# Patient Record
Sex: FEMALE | Race: WHITE
[De-identification: names, ages, dates, MRNs, and addresses within clinical notes are randomized per-mention and may not be internally consistent; named-entity substitution may affect disease eponyms.]

---
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
## 2020-02-17 ENCOUNTER — HOSPITAL ENCOUNTER (OUTPATIENT)
Dept: HOSPITAL 53 - M LAB | Age: 20
End: 2020-02-17
Attending: INTERNAL MEDICINE
Payer: COMMERCIAL

## 2020-02-17 DIAGNOSIS — E73.9: Primary | ICD-10-CM

## 2020-02-17 LAB
IGA SERPL-MCNC: 86 MG/DL (ref 70–400)
TSH SERPL DL<=0.005 MIU/L-ACNC: 0.44 UIU/ML (ref 0.46–3.98)

## 2020-02-28 ENCOUNTER — HOSPITAL ENCOUNTER (OUTPATIENT)
Dept: HOSPITAL 53 - M OPP | Age: 20
Discharge: HOME | End: 2020-02-28
Attending: INTERNAL MEDICINE
Payer: COMMERCIAL

## 2020-02-28 VITALS — SYSTOLIC BLOOD PRESSURE: 110 MMHG | DIASTOLIC BLOOD PRESSURE: 64 MMHG

## 2020-02-28 VITALS — BODY MASS INDEX: 24.8 KG/M2 | HEIGHT: 63 IN | WEIGHT: 140 LBS

## 2020-02-28 DIAGNOSIS — K64.8: Primary | ICD-10-CM

## 2020-02-28 DIAGNOSIS — K58.0: ICD-10-CM

## 2020-02-28 DIAGNOSIS — R19.7: ICD-10-CM

## 2020-02-28 NOTE — ROOR
________________________________________________________________________________

Patient Name: Brook Chugn        Procedure Date: 2/28/2020 9:34 AM

MRN: [id number]                          Account Number: [id number]

YOB: 2000               Age: 19

Room: [facility]                            Gender: Female

Note Status: Finalized                 

________________________________________________________________________________

 

Procedure:           Colonoscopy

Indications:         Clinically significant diarrhea of unexplained origin, 

                     Irritable bowel syndrome with diarrhea

Providers:           Jayson HUFF MD

Referring MD:        Rosa Elena HENDERSON NP

Requesting Provider: 

Medicines:           Monitored Anesthesia Care

Complications:       No immediate complications.

________________________________________________________________________________

Procedure:           Pre-Anesthesia Assessment:

                     - The heart rate, respiratory rate, oxygen saturations, 

                     blood pressure, adequacy of pulmonary ventilation, and 

                     response to care were monitored throughout the procedure.

                     The Colonoscope was introduced through the anus and 

                     advanced to 10 cm into the ileum. The colonoscopy was 

                     performed without difficulty. The patient tolerated the 

                     procedure well. The quality of the bowel preparation was 

                     good.

                                                                                

Findings:

     The perianal and digital rectal examinations were normal.

     Small Internal Hemorrhoids.

     The entire examined colon appeared normal on direct and retroflexion 

     views.

     The terminal ileum appeared normal.

                                                                                

Impression:          - Small Internal Hemorrhoids.

                     - The entire examined colon is normal on direct and 

                     retroflexion views.

                     - The examined portion of the ileum was normal.

                     - No specimens collected.

                     - (Irritable Bowel Syndrome/IBS-D suspected.)

                     - (lactose intolerance suspected)

Recommendation:      - Use fiber, for example Citrucel, Fibercon, Konsyl or 

                     Metamucil.

                     - Lactose free diet.

                                                                                

 

Jayson Huff MD

________________

Jayson HUFF MD

2/28/2020 9:55:14 AM

Electronically signed by Jayson HUFF MD

Number of Addenda: 0

 

Note Initiated On: 2/28/2020 9:34 AM

Estimated Blood Loss:

     Estimated blood loss: none.

## 2020-05-29 ENCOUNTER — HOSPITAL ENCOUNTER (EMERGENCY)
Dept: HOSPITAL 53 - M ED | Age: 20
Discharge: HOME | End: 2020-05-29
Payer: COMMERCIAL

## 2020-05-29 VITALS — HEIGHT: 63 IN | BODY MASS INDEX: 24.41 KG/M2 | WEIGHT: 137.79 LBS

## 2020-05-29 VITALS — SYSTOLIC BLOOD PRESSURE: 106 MMHG | DIASTOLIC BLOOD PRESSURE: 54 MMHG

## 2020-05-29 DIAGNOSIS — S00.03XA: Primary | ICD-10-CM
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
## 2021-06-03 ENCOUNTER — HOSPITAL ENCOUNTER (OUTPATIENT)
Dept: HOSPITAL 53 - M PLALAB | Age: 21
End: 2021-06-03
Attending: NURSE PRACTITIONER
Payer: COMMERCIAL

## 2021-06-03 DIAGNOSIS — N89.8: Primary | ICD-10-CM

## 2021-06-03 DIAGNOSIS — Z11.3: ICD-10-CM

## 2021-10-23 ENCOUNTER — HOSPITAL ENCOUNTER (EMERGENCY)
Dept: HOSPITAL 53 - M ED | Age: 21
Discharge: LEFT BEFORE DISCHARGE | End: 2021-10-23
Payer: COMMERCIAL

## 2021-10-23 VITALS — WEIGHT: 147.93 LBS | BODY MASS INDEX: 26.21 KG/M2 | HEIGHT: 63 IN

## 2021-10-23 VITALS — DIASTOLIC BLOOD PRESSURE: 58 MMHG | SYSTOLIC BLOOD PRESSURE: 119 MMHG

## 2021-10-23 DIAGNOSIS — Z53.29: Primary | ICD-10-CM

## 2021-10-23 NOTE — CCD
Continuity of Care Document (C-CDA R2.1) (Encounter date: 10/12/2021 03:15 PM)

                             Created on: 10/14/2021



Brook Anaya

External Reference #: [id number]

: 2000

Sex: Female



Demographics





                          Address                   [address] [place] [address]

 

                          Home Phone                [phone]

 

                          Preferred Language        Unknown

 

                          Marital Status            Never 

 

                          Nondenominational Affiliation     Unknown

 

                          Race                      Unknown

 

                          Additional Race(s)        Other Race



 

                          Ethnic Group              Not  or 





Author





                          Author                    [facility]

 

                          Organization              [facility]

 

                          Address                   Unknown

 

                          Phone                     Unavailable







Support





                Name            Relationship    Address         Phone

 

                No Information  Caregiver       Unknown         Unavailable







Care Team Providers





                    Care Team Member Name Role                Phone

 

                    Dwello Irma BLANK Unavailable         Unavailable







Allergies, Adverse Reactions, Alerts





                Substance       Reaction        Status          Criticality

 

                No Known Allergies                                              

   Active          No Information







Medications





           Medication Instructions Dosage     Effective Dates (start - stop) Sta

tus     Comments

 

                          Metrogel Vaginal 0.75 %   insert 1 applicatorful by va

ginal route  every day at 

bedtime x 5 nights                 Oct- - Oct- Active           

 

           glycopyrrolate 1 mg tablet                        -         Active   

   

 

           Nexplanon 68 mg subdermal implant                        -         Ac

tive      







Problems





                Condition       Effective Dates (start - stop) Clinical Status C

omments

 

                Human immunodeficiency virus [HIV] counseling                   

               

 

                Other sex counseling                                  

 

                Encounter for oth general cnsl and advice on contraception      

                            

 

                Encntr screen for infections w sexl mode of transmiss           

                       

 

                Other specified noninflammatory disorders of vagina             

                     

 

                Acute vaginitis                                  

 

                Encounter for screening for human immunodeficiency virus Oct-12-

2021 -                    

 

                Other sex counseling                                  

 

                Encounter for oth general cnsl and advice on contraception      

                            

 

                Human immunodeficiency virus [HIV] counseling                   

               

 

                Encntr screen for infections w sexl mode of transmiss           

                       

 

                High risk heterosexual behavior                                 

 

 

                Acute vaginitis                                  

 

                Encounter for pregnancy test, result negative                   

               

 

                Other sex counseling                                  

 

                Encounter for oth general cnsl and advice on contraception      

                            

 

                Acute vaginitis                                  

 

                Encntr screen for infections w sexl mode of transmiss           

                       

 

                High risk heterosexual behavior                                 

 

 

                High risk heterosexual behavior                                 

 

 

                Encntr screen for infections w sexl mode of transmiss           

                       

 

                Encounter for pregnancy test, result negative                   

               

 

                Other sex counseling                                  

 

                Encounter for oth general cnsl and advice on contraception      

                            

 

                Encounter for pregnancy test, result negative                   

               

 

                Dysuria                                          

 

                Encounter for oth general cnsl and advice on contraception      

                            

 

                Encounter for surveillance of contraceptive pills               

                   

 

                Encntr screen for infections w sexl mode of transmiss           

                       

 

                Other sex counseling                                  

 

                High risk heterosexual behavior                                 

 

 

                Candidiasis of vulva and vagina                                 

 

 

                Human immunodeficiency virus [HIV] counseling                   

               

 

                Encntr for gyn exam (general) (routine) w/o abn findings        

                          

 

                Encounter for oth general cnsl and advice on contraception      

                            

 

                Encounter for surveillance of contraceptive pills               

                   

 

                Human immunodeficiency virus [HIV] counseling                   

               

 

                Encounter for pregnancy test, result negative                   

               

 

                Encntr screen for infections w sexl mode of transmiss           

                       

 

                High risk heterosexual behavior                                 

 

 

                Encounter for oth general cnsl and advice on contraception      

                            

 

                Encounter for initial prescription of contraceptive pills       

                           

 

                Encounter for screening for human immunodeficiency virus        

                          







Procedures





                          Procedure                 Date

 

                          ROUTINE VENIPUNCTURE      Oct-

 

                          OFFICE VISIT, EST         Oct-

 

                          CHYLMD TRACH, SWAB        Oct-

 

                          N.GONORRHOEAE, SWAB       Oct-

 

                          HTLV/HIV SERUM TEST       Oct-

 

                          SYPHILLIS BLOOD SEROLOGY, QUALITATIVE Oct-

 

                          ASSAY OF BODY FLUID ACIDITY Oct-

 

                          SMEAR, WET MOUNT, SALINE/INK Oct-

 

                          CVR Blood Pressure        Oct-

 

                          CVR Med.Svc. Height/Weight Oct-

 

                          CVR Med.Svc. Vaginitis Rx Oct-

 

                          CVR Cns.Svc. Contraceptive Oct-

 

                          CVR Cns.Svc. Other        Oct-

 

                          CVR Cns.Svc. STI / H      Oct-







Results





        Test Name Date and Time Measure Units   Reference Range Abnormal Flag St

atus  

Comments

 

                    Panel Description: Wet Prep  Final                

 

                    Wet Prep            Oct- 15:46:57 Hyphae/Candida: no;

 Budding yeast: no; Trich: no; 

Clue cells: yes (>=20%); WBCs: no; Amine/Whiff test: positive; pH: 5.5          

                       A               

Final                                    

 

                    Panel Description: Vaginal pH  Final                

 

        Vaginal pH Oct- 15:46:40 pH: 5.5.                         Final  

  







Advance Directives





                Directive       Yes / No        Effective Date  File Name

 

                                        No Information 







Encounters





          Encounter Description Practice  Location  Reason(s) For Visit Diagnose

s Date      

Provider                                Providers Copied on Encounter

 

                          OFFICE VISIT, EST         [facility], [address], [place], [id number],  tel:[phone] [facility]          Vaginal Discharge 

(chief 

complaint)Vaginal Odor (chief complaint) Human immunodeficiency virus [HIV] 

counselingOther sex counselingEncounter for oth general cnsl and advice on 
contraceptionEncntr screen for infections w sexl mode of transmissOther 
specified noninflammatory disorders of vaginaAcute vaginitisEncounter for 
screening for human immunodeficiency virus Oct-               Fabian Zeng. [address] [place] [address], [id number], [place].  tel:[phone] Referring 

Provider: Irma Peralta, [address] [place] [address], [id number]. 
tel:[phone]

 

                                                    [facility], [address] [place] [address], [id number], 

[place] tel:[phone] [facility]                        Other sex counselin

gEncounter for oth 

general cnsl and advice on contraceptionHuman immunodeficiency virus [HIV] 
counselingEncntr screen for infections w sexl mode of transmissHigh risk 
heterosexual behaviorAcute vaginitis                Ana Dominguez. [address]

[address] [place], [place], [id number], [place].  tel:[phone] Referring Provider: 

Angelica Perez, [address] [place] [address], [id number]. tel:[phone]

 

                                                    [facility], [address] [place] [address], [id number], 

[place] tel:[phone] [facility]                        Encounter for pregn

arsen test, result 

negativeOther sex counselingEncounter for oth general cnsl and advice on 
contraceptionAcute vaginitisEncntr screen for infections w sexl mode of 
transmissHigh risk heterosexual behavior Mar-               Jose Daniel rivas. [address] [place] [address], [id number], [place].  tel:[phone] Referring Provider: 

Xiao Sky, [address] [place] [address], [id number]. tel:[phone]

 

                                                    [facility], [address] [place] [address], [id number], 

[place] tel:[phone] [facility]                        High risk heterosex

ual behaviorEncntr 

screen for infections w sexl mode of transmissEncounter for pregnancy test, 
result negativeOther sex counselingEncounter for oth general cnsl and advice on 
contraception                            Blaze Lewis. [address] [facility], [place], [place], [id number], 

[place].  tel:[phone]                  Referring Provider: Debbie Thakur, [address] [place], [place], [id number]. tel:[phone]

 

                                                    [facility], [address], [place], [id number], 

[place] tel:[phone] [facility]                        Encounter for pregn

arsen test, result 

negativeDysuriaEncounter for oth general cnsl and advice on 
contraceptionEncounter for surveillance of contraceptive pillsEncntr screen for 
infections w sexl mode of transmissOther sex counselingHigh risk heterosexual 
behaviorCandidiasis of vulva and vagina Oct-               Blaze Lewis. [address]

[address], [place], [id number], [place].  tel:[phone] Referring Provider: Debbie Thakur, [address], [place], [id number]. tel:[phone]

 

                                                    [facility], [address] [place] [address], [id number], 

[place] tel:[phone] [facility]                        Human immunodeficie

ncy virus [HIV] 

counselingEncntr for gyn exam (general) (routine) w/o abn findingsEncounter for 
oth general cnsl and advice on contraceptionEncounter for surveillance of 
contraceptive pills                      King Mayte. [address] [place], [place], 

[id number].  tel:[phone]           Referring Provider: Mayte Pratt, [address], [place], [id number]. tel:[phone]

 

                                                    [facility], [address], [place], [id number], 

[place] tel:[phone] [facility]                        Human immunodeficie

ncy virus [HIV] 

counselingEncounter for pregnancy test, result negativeEncntr screen for 
infections w sexl mode of transmissHigh risk heterosexual behaviorEncounter for 
oth general cnsl and advice on contraceptionEncounter for initial prescription 
of contraceptive pillsEncounter for screening for human immunodeficiency virus 

May-                             King Mayte. [address] [place] [address], [id number].  

tel:[phone]                       Referring Provider: Mayte Pratt, [address], [place], [id number]. tel:[phone]







Family History





                    Family Member       Diagnosis           Age At Onset

 

                    Brother             No history of Stroke  

 

                    Sister              No history of Myocardial infarction  

 

                    Brother             No history of Myocardial infarction  

 

                    1st degree relative No hx of cancer of breast, colon, endome

trium or ovary  

 

                    Maternal grandmother Cancer               

 

                    Maternal grandmother Cancer, throat       

 

                    Mother              No history of Stroke  

 

                    Paternal grandmother Diabetes mellitus    

 

                    1st degree relative No hx of coronary heart disease (female 

<65, male <55)  

 

                    Mother              No history of Myocardial infarction  

 

                    1st degree relative No hx of osteoporosis  

 

                    Maternal grandmother Cancer, lung         

 

                    Father              No history of Myocardial infarction  

 

                    Sister              No history of Stroke  

 

                    1st degree relative No hx of venous thromboembolism  

 

                    Father              No history of Stroke  







Immunizations





                Vaccine         Date            Status          Comments

 

                                        No Information 







Payers





                Payer name      Insurance type  Covered party ID Authorization(s

)

 

                [facility]         [place]              [id number]        







Social History





             Type         Description  Quantity     Date Captured Comments

 

             Alcohol Use Details Unknown                   Oct-   

 

             Caffeine Use Details Unknown                   Oct-   

 

             Tobacco Use Status Current non-smoker              Oct-   

 

             Smoking Status Never smoker              Oct-   

 

                          Non-Smoking Tobacco Use Details 



: No Details Available

                                        



: No Details Available

                          Oct-                

 

             Birth Sex    Female                                  







Vital Signs





        Date / Time: Height  Weight  BMI     Pulse Rate Blood Pressure Temperatu

re Respiratory

 Rate        Body Surface Area Head Circumference BMI percentile Pulse Ox     In

haled Ox

 

       Oct- 3:17 PM 63.00 in 143.80 lbs 25.47 kg/meter(2)        120/78 m

m[Hg]                      

                                                             







Chief Complaint And Reason For Visit





*** Most recent encounter only, dated '10/12/2021 15:15'. ***



Vaginal Discharge (chief complaint)



Vaginal Odor (chief complaint)



Reason For Referral





                                        Reason For Referral

 

                                        No Information







Plan Of Treatment





                Date            Type            Action          Status

 

                                        No Information 







History Of Present Illness





                    Encounter Date      Complaint           History Of Present I

llness

 

                                        No Information 







Functional Status





                          Date                      Functional Assessment

 

                                        No Information 







Medications Administered





           Medication Instructions Dosage     Effective Dates (start - stop) Sta

tus     Comments

 

                                        No Information 







Instructions





                    Date                Instruction         Additional Informati

on

 

                                        No Information 







Assessments





                    Type                Assessment          Date

 

                    assessment          Human immunodeficiency virus [HIV] couns

eling Oct-

 

                    assessment          Other sex counseling Oct-

 

                    assessment          Encounter for oth general cnsl and advic

e on contraception 

Oct-

 

                    assessment          Encntr screen for infections w sexl mode

 of transmiss Oct-

 

                    assessment          Other specified noninflammatory disorder

s of vagina Oct-

 

                    assessment          Acute vaginitis     Oct-







Goals





           Health Concern Goal       Type       Priority   Status     Date

 

                                        No Information 







Medical Equipment





             Description  Device       Universal Device Identifier Effective Jairo

es (start - stop) 

Status

 

                                        No Information 







Mental Status





                          Date                      Cognitive Assessment

 

                                        No Information 







Health Concerns





                          Observation               Date

 

                                        No Information 







                    Concern             Status              Date

 

                                        No Information 







Physical Examination





                    Exam                Findings            Details

 

                    Neurological        Normal              Level of consciousne

ss - Normal. Orientation - Normal.

 

                    Genitourinary       Normal              Urethral meatus - No

rmal. Urethra - Normal. External 

genitalia - Normal. Cervix - Normal.

 

                    Genitourinary       *                   Vagina - white cream

y discharge w/ odor.

## 2021-10-23 NOTE — CCD
Summarization Of Episode

                             Created on: 10/23/2021



EVETTE CASTANEDA

External Reference #: [id number]

: 2000

Sex: Undifferentiated



Demographics





                          Address                   [address] [place] [address]

 

                          Home Phone                [phone]

 

                          Preferred Language        English

 

                          Marital Status            Unknown

 

                          Anabaptist Affiliation     Unknown

 

                          Race                      Unknown

 

                          Ethnic Group              Not  or 





Author





                          Author                    [facility]

 

                          Organization              [facility]

 

                          Address                   Unknown

 

                          Phone                     Unavailable







Support





                Name            Relationship    Address         Phone

 

                    [facility]     [place] [address]                     Unavailable

 

                    Azucena Bull Next Of Kin         [address] [place] [address]                    [phone]

 

                    Arianna Aldana Next Of Kin         [address] [place] [address]                    [phone]

 

                    Shaista HOANG,  Cynthia Next Of Kin         [address] [place], [place]  [id number]                [phone]

 

                ST              Next Of Kin     Unknown         Unavailable

 

                UE              Next Of Kin     Unknown         Unavailable

 

                    FAITH KAT Next Of Kin         [address] [place] [id number]                     [phone]

 

                    SWETA MCNAIR        Next Of Kin         [address] 

                                        [address] [place]  [address]                     [phone]

 

                    KAREN MCNAIR      Next Of Kin         [address] [place] [address]                     [phone]

 

                    Donn RAO,  Blanche   Next Of Kin         [address] [place] [address]                    [phone]

 

                    SWETA Love MD Next Of Kin         [address] [place] [address]  [address]               [phone]

 

                    SWETA MCNAIR        ECON                [address] [place]  [address]                      Unavailable

 

                    LIBERTY MCNAIR     ECON                [address] [place] [address]                     Unavailable







Care Team Providers





                    Care Team Member Name Role                Phone

 

                    KARAN Moore MD   Unavailable         Unavailable

 

                    KARAN Moore MD   Unavailable         Unavailable

 

                    KARAN Moore MD   Unavailable         Unavailable

 

                    KARAN Moore MD   Unavailable         Unavailable

 

                    KARAN Moore MD   Unavailable         Unavailable

 

                    KARAN Moore MD   Unavailable         Unavailable

 

                    KARAN Moore MD   Unavailable         Unavailable

 

                    KARAN Moore MD   Unavailable         Unavailable

 

                    KARAN Moore MD   Unavailable         Unavailable

 

                    KARAN Moore MD   Unavailable         Unavailable

 

                    KARAN Moore MD   Unavailable         Unavailable

 

                    KARAN Moore MD   Unavailable         Unavailable

 

                    KARAN Moore MD   Unavailable         Unavailable

 

                    KARAN Moore MD   Unavailable         Unavailable

 

                    KARAN Moore MD   Unavailable         Unavailable

 

                    KARAN Moore MD   Unavailable         Unavailable

 

                    KARAN Moore MD   Unavailable         Unavailable

 

                    KARAN Moore MD   Unavailable         Unavailable

 

                    KARAN Moore MD   Unavailable         Unavailable

 

                    KARAN Moore MD   Unavailable         Unavailable

 

                    KARAN Moore MD   Unavailable         Unavailable

 

                    KARAN Moore MD   Unavailable         Unavailable

 

                    KARAN Moore MD   Unavailable         Unavailable

 

                    KARAN Moore MD   Unavailable         Unavailable

 

                    KARAN Moore MD   Unavailable         Unavailable

 

                    KARAN Moore MD   Unavailable         Unavailable

 

                    KARAN Moore MD   Unavailable         Unavailable

 

                    KARAN Moore MD   Unavailable         Unavailable

 

                    KARAN Moore MD   Unavailable         Unavailable

 

                    KARAN Moore MD   Unavailable         Unavailable

 

                    KARAN Moore MD   Unavailable         Unavailable

 

                    KARAN Moore MD   Unavailable         Unavailable

 

                    KARAN Moore MD   Unavailable         Unavailable

 

                    KARAN Moore MD   Unavailable         Unavailable

 

                    KARAN Moore MD   Unavailable         Unavailable

 

                    KARAN Moore MD   Unavailable         Unavailable

 

                    KARAN Moore MD   Unavailable         Unavailable

 

                    KARAN Moore MD   Unavailable         Unavailable

 

                    KARAN Moore MD   Unavailable         Unavailable

 

                    KARAN Moore MD   Unavailable         Unavailable

 

                    KARAN Moore MD   Unavailable         Unavailable

 

                    KARAN Moore MD   Unavailable         Unavailable

 

                    KARAN Moore MD   Unavailable         Unavailable

 

                    KARAN oMore MD   Unavailable         Unavailable

 

                    KARAN Moore MD   Unavailable         Unavailable

 

                    KARAN Moore MD   Unavailable         Unavailable

 

                    KARAN Moore MD   Unavailable         Unavailable

 

                    KARAN Moore MD   Unavailable         Unavailable

 

                    KARAN Moore MD   Unavailable         Unavailable

 

                    KARAN Moore MD   Unavailable         Unavailable

 

                    KARAN Moore MD   Unavailable         Unavailable

 

                    KARAN Moore MD   Unavailable         Unavailable

 

                    KARAN Moore MD   Unavailable         Unavailable

 

                    KARAN Moore MD   Unavailable         Unavailable

 

                    KARAN Moore MD   Unavailable         Unavailable

 

                    KARAN Moore MD   Unavailable         Unavailable

 

                    KARAN Moore MD   Unavailable         Unavailable

 

                    KARAN Moore MD   Unavailable         Unavailable

 

                    KARAN Moore MD   Unavailable         Unavailable

 

                    KARAN Moore MD   Unavailable         Unavailable

 

                    KARAN Moore MD   Unavailable         Unavailable

 

                    KARAN Moore MD   Unavailable         Unavailable

 

                    KARAN Moore MD   Unavailable         Unavailable

 

                    KARAN Moore MD   Unavailable         Unavailable

 

                    KARAN Moore MD   Unavailable         Unavailable

 

                    KARAN Moore MD   Unavailable         Unavailable

 

                    KARAN Moore MD   Unavailable         Unavailable

 

                    KARAN Moore MD   Unavailable         Unavailable

 

                    KARAN Moore MD   Unavailable         Unavailable

 

                    KARAN Moore MD   Unavailable         Unavailable

 

                    KARAN Moore MD   Unavailable         Unavailable

 

                    KARAN Moore MD   Unavailable         Unavailable

 

                    KARAN Moore MD   Unavailable         Unavailable

 

                    KARAN Moore MD   Unavailable         Unavailable

 

                    KARAN Moore MD   Unavailable         Unavailable

 

                    KARAN Moore MD   Unavailable         Unavailable

 

                    KARAN Moore MD   Unavailable         Unavailable

 

                    KARAN Moore MD   Unavailable         Unavailable

 

                    KARAN Moore MD   Unavailable         Unavailable

 

                    KARAN Moore MD   Unavailable         Unavailable

 

                    KARAN Moore MD   Unavailable         Unavailable

 

                    KARAN Moore MD   Unavailable         Unavailable

 

                    KARAN Moore MD   Unavailable         Unavailable

 

                    KARAN Moore MD   Unavailable         Unavailable

 

                    KARAN Moore MD   Unavailable         Unavailable

 

                    KARAN Moore MD   Unavailable         Unavailable

 

                    KARAN Moore MD   Unavailable         Unavailable

 

                    KARAN Moore MD   Unavailable         Unavailable

 

                    KARAN Moore MD   Unavailable         Unavailable

 

                    KARAN Moore MD   Unavailable         Unavailable

 

                    KARAN Moore MD   Unavailable         Unavailable

 

                    KARAN Moore MD   Unavailable         Unavailable

 

                    KARAN Moore MD   Unavailable         Unavailable

 

                    Aleman,  Jack MD        Unavailable         Unavailable

 

                    Aleman,  Jack MD        Unavailable         Unavailable

 

                    Aleman,  Jack MD        Unavailable         Unavailable

 

                    Aleman,  Jack MD        Unavailable         Unavailable

 

                    Aleman,  Jack MD        Unavailable         Unavailable

 

                    Aleman,  Jack MD        Unavailable         Unavailable

 

                    Dwello PA PA,  Irma Unavailable         Unavailable

 

                    Dwello PA PA,  Irma Unavailable         Unavailable

 

                    Dwello PA PA,  Irma Unavailable         Unavailable

 

                    Dwello PA PA,  Irma Unavailable         Unavailable

 

                    Dwello PA PA,  Irma Unavailable         Unavailable

 

                    Dwello PA PA,  Irma Unavailable         Unavailable

 

                    Dwello PA PA,  Irma Unavailable         Unavailable

 

                    NON, PHYSICIAN STAFF Unavailable         Unavailable



                                  



Re-disclosure Warning

          The records that you are about to access may contain information from 
federally-assisted alcohol or drug abuse programs. If such information is 
present, then the following federally mandated warning applies: This information
has been disclosed to you from records protected by federal confidentiality 
rules (42 CFR part 2). The federal rules prohibit you from making any further 
disclosure of this information unless further disclosure is expressly permitted 
by the written consent of the person to whom it pertains or as otherwise 
permitted by 42 CFR part 2. A general authorization for the release of medical 
or other information is NOT sufficient for this purpose. The Federal rules 
restrict any use of the information to criminally investigate or prosecute any 
alcohol or drug abuse patient.The records that you are about to access may 
contain highly sensitive health information, the redisclosure of which is 
protected by Article 27-F of the [facility] Public Health law. If you 
continue you may have access to information: Regarding HIV / AIDS; Provided by 
facilities licensed or operated by the [facility] Office of Mental Health; 
or Provided by the [facility] Office for People With Developmental 
Disabilities. If such information is present, then the following [place] 
mandated warning applies: This information has been disclosed to you from 
confidential records which are protected by state law. State law prohibits you 
from making any further disclosure of this information without the specific 
written consent of the person to whom it pertains, or as otherwise permitted by 
law. Any unauthorized further disclosure in violation of state law may result in
a fine or penitentiary sentence or both. A general authorization for the release of 
medical or other information is NOT sufficient authorization for further disc
losure.                                                                         
    



Allergies and Adverse Reactions

          



           Type       Description Substance  Reaction   Status     Data Source(s

)

 

           Allergy to substance Allergy to substance Allergy to substance       

                REGIS ([facility])



                                                                                
       



Family History

          



             Family Member Name Family Member Gender Family Member Status Date o

f Status 

Description                             Data Source(s)

 

           Unknown    Male       Diagnosis  2016 12:00:00 AM EDT          

  [facility] ([facility]

 

           Unknown    Male       Diagnosis  2016 12:00:00 AM EDT          

  [facility] ([facility]



                                                                                
       



Encounters

          



           Encounter  Providers  Location   Date       Indications Data Source(s

)

 

                OFFICE VISIT, ESTOutpatient Attender: Irma COV[place] 

10/12/2021 03:15:00 PM EDT - 10/12/2021 03:15:00 PM EDT Encounter for screening 

for human immunodeficiency virusAcute vaginitisOther specified noninflammatory 
disorders of vaginaEncntr screen for infections w sexl mode of 
transmissEncounter for oth general cnsl and advice on contraceptionOther sex 
counselingHuman immunodeficiency virus [HIV] counseling [facility]

 

                                        Encounter for screening for human immuno

deficiency virus 

 

                                        Acute vaginitis 

 

                                        Other specified noninflammatory disorder

s of vagina 

 

                                        Encntr screen for infections w sexl mode

 of transmiss 

 

                                        Encounter for oth general cnsl and advic

e on contraception 

 

                                        Other sex counseling 

 

                                        Human immunodeficiency virus [HIV] couns

[facility] 

 

                Emergency       Attender: Kingsley Aleman MDConsultant: STAFF NON       

          2021 12:04:00 PM EDT

 - 2021 12:51:00 PM EDT                           [facility]

 

                                        Patient discharged. 

 

                Outpatient                      [address] [facility] [place] [address] 2021 12:00:00 AM

 EDT                                                eCW1 ([facility])

 

                Outpatient                      [address] [facility] [place] [address] 2021 12:00:00 AM

 EDT                                                eCW1 ([facility])

 

                Unknown                         [address] [facility] [place] [address] 2021 12:00:00 AM 

EDT                                                 eCW1 ([facility])

 

                (WC PROC) [facility]                 [address] [place] [address] 

2021 12:00:00 AM EDT                           eCW1 ([facility])

 

                          Blake Moore MD: [address] [place] [address]

[address], Ph. [phone] 

Attender: Blake Moore MD  [facility] 

12/10/2020 12:00:00 AM EST                           REGIS ([facility])



                                                                                
                                                                    



Medications

          



          Medication Brand Name Start Date Product Form Dose      Route     Admi

nistrative 

Instructions Pharmacy Instructions Status     Indications Reaction   Description

 Data 

Source(s)

 

                          Metronidazole 0.0075 MG/MG Vaginal Gel [MetroGel] Metr

ogel Vaginal 0.75 % 

Metrogel Vaginal 0.75 % 10/12/2021 12:00:00 AM EDT                              

      active               

metronidazole 0.0075 MG/MG Vaginal Gel [MetroGel] [facility] ([facility])

 

          1 mg                2021 12:00:00 AM EDT tablet    90           

       TAKE 1 TABLET BY MOUTH 1-3 TIMES A

 DAY       TAKE 1 TABLET BY MOUTH 1-3 TIMES A DAY SOLD: 2021              

                    [facility]

 

                          Metronidazole 0.0075 MG/MG Vaginal Gel Metronidazole 0

.75 % Metronidazole 0.75 %

      2021 12:00:00 AM EDT                               active           

  Metronidazole 0.75 % eCW1 

([facility])

 

          0.75 %              2021 12:00:00 AM EDT gel       70           

       INSERT 1 APPLICATORFUL VAGINALLY AT

 BEDTIME FOR 5 DAYS       INSERT 1 APPLICATORFUL VAGINALLY AT BEDTIME FOR 5 DAYS

 SOLD:

 2021                                                     [facility]

 

             Terbinafine HCl 1 % Terbinafine HCl 1 % 2021 12:00:00 AM EDT 

             1.0 

{application}                         active                  Terbinafine HCl 1 

% eCW1 ([facility])

 

                          Naftifine hydrochloride 10 MG/ML Topical Cream Naftifi

ne HCl 1 % Naftifine HCl 1

 %     2021 12:00:00 AM EDT        1.0 {application}                      

active               Naftifine HCl

 1 %                                    eCW1 ([facility])

 

                          Naftifine hydrochloride 10 MG/ML Topical Cream Naftifi

ne HCl 1 % Naftifine HCl 1

 %     2021 12:00:00 AM EDT        1.0 {application}                      

active               Naftifine HCl

 1 %                                    eCW1 ([facility])

 

        1 %             2021 12:00:00 AM EDT cream   30              APPLY

 TOPICALLY ONCE DAILY APPLY 

TOPICALLY ONCE DAILY SOLD: 2021                                        [facility]

 

             Terbinafine HCl 1 % Terbinafine HCl 1 % 2021 12:00:00 AM EDT 

             1.0 

{application}                         active                  Terbinafine HCl 1 

% eCW1 ([facility])

 

             Terbinafine HCl 1 % Terbinafine HCl 1 % 2021 12:00:00 AM EDT 

             1.0 

{application}                         active                          eCW1 ([facility])

 

                          Naftifine hydrochloride 10 MG/ML Topical Cream Naftifi

ne HCl 1 % Naftifine HCl 1

 %    2021 12:00:00 AM EDT       1.0 {application}                   activ

e                   eCW1 

([facility])

 

             Fluconazole 150 MG Oral Tablet Fluconazole 150 MG 2021 12:00:

00 AM EDT              

1.0 {tablet}                         suspended                 Fluconazole 150 M

G eCW1 ([facility])

 

             Fluconazole 150 MG Oral Tablet Fluconazole 150 MG 2021 12:00:

00 AM EDT              

1.0 {tablet}                         active                  Fluconazole 150 MG 

eCW1 ([facility])

 

             Fluconazole 150 MG Oral Tablet Fluconazole 150 MG 2021 12:00:

00 AM EDT              

1.0 {tablet}                         suspended                 Fluconazole 150 M

G eCW1 ([facility])

 

             Fluconazole 150 MG Oral Tablet Fluconazole 150 MG 2021 12:00:

00 AM EDT              

1.0 {tablet}                         suspended                         eCW1 ([facility])

 

          90 mcg/actuation           10/11/2019 12:00:00 AM EDT HFA aerosol inha

ler 8                   INHALE TWO

 PUFFS BY MOUTH EVERY 4 HOURS AS NEEDED FOR COUGHING CHEST TIGHTNESS WHEEZING OR
 FOR SHORTNESS OF BREATH                INHALE TWO PUFFS BY MOUTH EVERY 4 HOURS 

AS NEEDED FOR 

COUGHING CHEST TIGHTNESS WHEEZING OR FOR SHORTNESS OF BREATH SOLD: 2020   

                     

                                                    [facility]



                                                                                
                                                                                
                                                          



Insurance Providers

          



             Payer name   Policy type / Coverage type Policy ID    Covered party

 ID Covered 

party's relationship to rivera Policy Rivera             Plan Information

 

          Medicaid  S         [id number]            S                   [id number]

 

          Medicaid  S         [id number]            S                   [id number]

 

          Managed Care - Community Plan [facility] P         [id number]   

        S                   [id number]

 

          Medicaid  S         [id number]            S                   [id number]

 

          Managed Care - Community Plan [facility] P         [id number]   

        S                   [id number]

 

          [facility]       I         [id number]           Self                [id number]

 

          Managed Care - Community Plan [facility] P         [id number]   

        S                   [id number]

 

          [facility]   [id number] self                [id number]

 

             [facility] Community Plan Commercial                [id number]

[id number] Self

                                                     

 

          Medicaid  S         [id number]            S                   [id number]

 

          Medicaid  S         [id number]            S                   [id number]

 

          Managed Care - Community Plan [facility] P         [id number]   

        S                   [id number]

 

          Medicaid  P         [id number]            S                   [id number]

 

          [facility] MEDICAID           [id number]           S            

       [id number]

 

          Managed Care - [facility] Community Plan P         [id number]           S     

              [id number]

 

          Managed Care - Community Plan [facility] P         [id number]   

        S                   [id number]

 

                [facility] Community Plan Commercial      [id number]       

[id number] Family Dependent                   

     [id number]

 

          Medicaid  S         [id number]            S                   [id number]

 

          [facility]             [place]   [id number] self                [id number]

 

          Managed Care - [facility] Community Plan P         [id number]           S     

              [id number]

 

          MEDICAID            [id number]            S                   [id number]

 

                    ANSI-Medicaid [id number]          

                     

[id number]

 

                    ANSI-Medicaid [id number]          

                     

[id number]

 

          [facility]           [id number]           SP           

       [id number]

 

          Managed Care - [facility] Community Plan P         [id number]           S     

              [id number]

 

                    ANSI-Medicaid [id number]          

                     

[id number]

 

                    ANSI-Medicaid [id number]          

                     

[id number]

 

          [employer]HC COMMUNITY PLAN MCDO           [id number]           SP           

       [id number]

 

          MEDICAID            [id number]            SP                  [id number]

 

                    ANSI-Medicaid [id number]          

                     

[id number]

 

          SELF PAY ONLY           [id number]           SP                  [id number]

[id number]

 

          MEDICAID            [id number]           [facility]                 [id number]

[id number]

 

          [facility]         [id number] S              

     [id number]

 

          MEDICAID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

 

                                                                       









                    ID                  Date                Data Source

 

                    [id number]      2021 12:04:00 PM EDT [facility]

 

                                                                                

                                        

                                       1                                       
Clinical Report - Nurses                                       [facility]                                      Emergency Department              
                  [address] [place] [address]                          
        Phone #: [phone] ext- [phone]                                       
   2021 12:04                        
----------------------------------------------------                            
          Patient: EVETTE MCNAIR                                  MRN: [id number]   
   Acct#: [id number]                                Sex: F : 2000 Age: 
20yTRIAGEAcuity: LEVEL 4.Chief Complaint: BURN TO LEFT HAND, LEFT INDEX FINGER, 
LEFT MIDDLE FINGER, LEFT RINGFINGER and LEFT LITTLE FINGER FROM HOT 
LIQUID.Alert. No acute distress.Location of injuries: left index finger, left 
middle finger, left ring finger and left little finger. Occurred 
11:5607. ( Pt was at work and was pouring gravy when she spilled some on
 her left hand. She haspain in all four fingers.).Treatment PTA:Applied 
ice.SEPSIS SCREEN: SIRS SCREEN NEGATIVE. SEPSIS SCREEN NEGATIVE. No suspected or
 confirmedsigns of infection present.MICHELE COMA SCORE: 15- eyes open- 
spontaneous (4); best verbal response- oriented (5); bestmotor response- obeys 
commands (6). --12:14 21 Nayeli Rivas R.N.12:10 21. BP: 131/71. MAP:
 91. HR: 72. RR: 16. O2 saturation: 100% on room air. Pain level now:4/10. 
--12:14 21 Nayeli Rivas R.N.12:16 21. Temp: 98.2 F (oral). --12:17 
21 Nayeli Rivas R.N.Weight: 63.5 kg stated. Height/Length: 63 inches Per 
Patient. BMI: 24.8. --12:09 21 Nayeli Rivas R.N.MedicationsNexplanon 
Subcutaneous. --12:15 21 Nayeli Rivas R.N.AllergiesNo Known Drug Allergy. 
--12:15 21 Nayeli Rivas R.N.PROBLEMS:no known problems.Medication/allergy 
information source: the patient. --12:14 21 Nayeli Rivas R.N.ADDITIONAL 
SURGERIES:                                                                      
                                       2                                    
Clinical Report - Nurses                                      [facility]                                     Emergency Department               
                [address] [place] [address]                            
     Phone #: [phone] ext- [phone]                                         
2021 12:04                       
----------------------------------------------------                            
         Patient: EVETTE MCNAIR                                 MRN: [id number]     
 Acct#: [id number]                               Sex: F : 2000 Age: 20y 
Tonsillectomy. --12:15 21 Nayeli Rivas R.N. History PAST MEDICAL HX: 
Tetanus status: up-to-date. SOCIAL HX: Never smoker. No alcohol use or drug use.
 She was offered HIV testing but declined. Patient education was provided. She 
was offered hepatitis C testing but declined. Patient education was provided. 
She has not traveled outside the [place] Infectious disease exposure: No infectious
 disease exposure. (COVID screen negative). Patient is not a known carrier of 
tuberculosis, hepatitis, HIV, MRSA or VRE. Patient is not a known carrier of 
CRE. SELF HARM ASSESSMENT: Self harm assessment was performed. The patient answe
red "no" to the question(s) "Do you have thoughts of harming or killing 
yourself?", "Do you have a plan for harming or killing yourself?" and "Have you 
recently had thoughts about harming or killing others?". ABUSE ASSESSMENT: Abuse
 assessment. The patient had positive responses to the question(s) "Do you feel 
safe in your home?" (yes). Abuse denied. No suspicion of abuse. No report of 
abuse. NUTRITIONAL RISK ASSESSMENT: The nutritional risk assessment revealed no 
deficiencies. FUNCTIONAL ASSESSMENT: Functional assessment: no impairments 
noted. LEARNING NEEDS ASSESSMENT: The learning needs assessment revealed no 
barriers. FALL RISK ASSESSMENT: Fall risk assessment completed. No risk factors 
identified. SKIN INTEGRITY ASSESSMENT: Skin integrity risk assessment completed.
 No skin integrity risk identified. --12:14 21 Nayeli Rivas R.N. 
Interventions Identification band on patient. --12:14 21 Nayeli Rivas R.N.PHYSICAL ASSESSMENTGENERAL / NEURO / PSYCH: Alert. Oriented X 4. Appears in 
no acute distress.RESPIRATORY: Respirations not labored.EXTREMITIES: Skin intact
 on the extremities. Left ring finger: 1st degree superficial burn. Left midd
lefinger. Left index finger. --12:44 21 Felix Peoples R.N.NURSING PROGRESS 
NOTESPatient gowned. Reassurance given. Three patient identifiers checked. Call 
light placed in reach. Siderails up x 2. Bed placed in lowest position. Brakes 
of bed on. Patient ready for evaluation- PA notified.--12:15 21 Nayeli Rivas R.N. 12:43 2021 Bacitracin Zinc Topical Ointment 1 application 
given. --12:43 21 Felix Peoples R.N.                                        
                                                                   3            
                       Clinical Report - Nurses                                 
     [facility]                                     Emergency 
Department                               [address] [place] [address]   
                              Phone #: [phone]- [phone]                 
                        2021 12:04                       
----------------------------------------------------                            
         Patient: EVETTE MCNAIR                                 MRN: [id number]     
 Acct#: [id number]                               Sex: F : 2000 Age: 
20yDISPOSITION / DISCHARGE No learning barriers present. Discharge instructions 
provided and reviewed with the patient. Patient verbalized understanding. 
Written instructions provided in English. The patient was discharged by the 
physician assistant. She was discharged home. She left ambulatory and via 
private vehicle. Patient driving. --12:50 21 Felix Peoples R.N. 12:49 
21. BP: 122/64. MAP: 83. HR: 62. RR: 16. O2 saturation: 98%. Temp: def
erred. Pain level now: 2/10. --12:50 21 Felix Peoples R.N. Departure time: 
12:50 2021. --12:50 21 Felix Peoples R.N.Locked/Released at 2021
 12:51 by Felix Peoples R.N.  









          Name      Value     Range     Interpretation Code Description Data Janey

rce(s) Supporting 

Document(s)

 

                                                                       









                    ID                  Date                Data Source

 

                    [id number]      2021 12:04:00 PM EDT [facility]

 

                                                                                

                                        

                           1                           Clinical Report - 
Physicians/Mid Levels                                        [facility]                                       Emergency Department             
                    [address] [place] [address]                        
           Phone #: [phone]- [phone]                                    
       2021 12:04                         
----------------------------------------------------                            
           Patient: EVETTE MCNAIR                                   MRN: [id number] 
     Acct#: [id number]                                 Sex: F : 2000 Age:
 20y Time Seen: 12:30 2021. Arrived- By private vehicle. Historian- 
patient.HISTORY OF PRESENT ILLNESS Chief Complaint: BURN. The patient sustained 
a burn to the left upper extremity - left hand, left index finger, left middle 
finger, left ring finger and left little finger. It occurred at work. The injury
 was due to hot liquid (gravy). The patient complains of mild pain. There was no
 smoke inhalation. Patient did not fall.REVIEW OF SYSTEMSLast normal menstrual 
period unknown- nexplanon, denies pregnancy. No difficulty breathing, chest 
pain,visual disturbance, hearing loss or numbness. No weakness, neck pain, 
nausea, easy bleeding orabrasions. No hematuria, spine pain or vomiting. No 
coughing up soot.PAST HISTORYProblems:no known problems. Additional Surgeries: 
Tonsillectomy. Medications: Nexplanon Subcutaneous. Allergies: No Known Drug All
ergy.SOCIAL HISTORYNever smoker. No alcohol use or drug use.PHYSICAL EXAMVital 
Signs: 2021 12:10 BP: 131/71. MAP: 91. HR: 72. RR: 16. O2 saturation: 100%
 on room air.Pain level now: 4/10. Have been reviewed as normal. Oxygen 
saturation normal.Appearance: Alert. Oriented X3. No acute distress.Head: Head 
atraumatic.Eyes: Pupils equal, round and reactive to light. EOM intact. 
Conjunctivae and eyelids normal.ENT: Normal external inspection. Nares normal. 
Pharynx normal.Neck: Neck non-tender.CVS: Heart sounds normal.Respiratory: No 
respiratory distress.                                                           
                                                           2                    
         Clinical Report - Physicians/Mid Levels                                
         [facility]                                        Emergency
 Department                                  [address] [place] [address]                                    Phone #: [phone]- [phone]      
                                      2021 12:04                          
----------------------------------------------------                            
            Patient: EVETTE MCNAIR                                    MRN: 
[id number]      Acct#: [id number]                                  Sex: F : 
2000 Age: 20y  Abdomen: No visible injury.  Back: No tenderness.  Skin: No
 abrasions or lacerations. Left hand: small 1st degree burn dorsal aspect and 
volar aspect. Left  small finger: small 1st degree burn volar aspect. Left ring 
finger: small 1st degree burn volar aspect. Left  middle finger: small 1st 
degree burn volar aspect. Left index finger: small 1st degree burn volar aspect.
  No circumferential burn present, vesicles present or contamination present.  
Left Upper Extremity area: 1% BSA.  Percent Total Body Surface Area Burned: 1%. 
 Extremities: Extremities atraumatic.  Neuro: Oriented X 3.PROGRESS AND 
PROCEDURESCourse of Care: 12:. Evaluation after observation. 
(Discussed superficial 1st degree burn,wound care. s/s of infection and pt is 
agreeable with dx and tx plan.).  Patient counseled in person regarding the 
patient's stable condition, diagnosis and need for follow-up.  Patient agrees 
with plan of care. 12:.  Disposition: Discharged home in good and 
improved condition (:).CLINICAL IMPRESSION Single first degree 
thermal burn to the left index finger, to the left middle finger, to the left 
ring finger and to the left little finger. TOTAL BSA of burn = less than 10% 
(approximately). BSA of 1st degree burn = less than 10% (approximately). BSA of 
2nd degree burn = 0% BSA of 3rd degree burn = 0%.INSTRUCTIONS Protect area of 
burn and keep clean. Change dressing daily. Keep wounds dry. You may wash wounds
 briefly, then dry. Do not work today, tomorrow (may return to work on Monday). 
 Warnings: GENERAL WARNINGS: Return or contact your physician immediately if 
your condition  worsens or changes unexpectedly, if not improving as expected, 
or if other problems arise. Specifically  return if pain or fever worsens.  Your
 Current Medications: Your current home medications have been reviewed.  
CONTINUE TAKING THE FOLLOWING MEDICATIONS:  Nexplanon Subcutaneous.  
Prescription Medications:  bacitracin 500 unit/gram topical ointment Apply 1 a 
small amount twice a day for 10 days -- Dispense 1                              
                                                                               3
                          Clinical Report - Physicians/Mid Levels               
                       [facility]                                   
  Emergency Department                               [address] [place] [address]                                 Phone #: [phone]- [phone]     
                                    2021 12:04                       
----------------------------------------------------                            
         Patient: EVETTE MCNAIR                                 MRN: [id number]     
 [id number]                               Sex: F : 2000 Age: 20y 
tube. Refills: 0. Substitution permitted. [facility] [address] [place] [address] ; [place], [place] [id number]. Phone: [phone] FaxNumber: [phone]. aspirin 325 mg tablet Take 1 tablet four times a day for 10 days -- 
PP. Dispense 40 tablet. Refills: 0. Substitution permitted. [facility] [address] [place] [address] ; [place], [place] [id number]. Phone: [phone]
 FaxNumber: [phone]. Follow-up: Follow up with your doctor as needed. 
Reason for referral: evaluation and treatment. Summary of care provided to 
patient. Understanding of the discharge instructions verbalized by 
patient.(Electronically signed by ABHAY Parker 2021 21:44)  









          Name      Value     Range     Interpretation Code Description Data Janey

rce(s) Supporting 

Document(s)

 

                                                                       









                    ID                  Date                Data Source

 

                    [id number]           2021 12:00:00 AM EDT [facility]









          Name      Value     Range     Interpretation Code Description Data Janey

rce(s) Supporting 

Document(s)

 

          SARS-CoV-2 RNA Resp Ql VERA+probe Not Detected                         

      [facility]     

 

                                        This lab was ordered by [employer]-

 Employee and reported by [employer]. 









                    ID                  Date                Data Source

 

                    [id number]           2021 12:00:00 AM EDT [facility]









          Name      Value     Range     Interpretation Code Description Data Janey

rce(s) Supporting 

Document(s)

 

          SARS-CoV-2 RNA Resp Ql VERA+probe Not Detected                         

      [facility]     

 

                                        This lab was ordered by [employer]-

 Employee and reported by [employer]. 









                    ID                  Date                Data Source

 

                    [id number]           2021 10:05:00 AM EDT [facility]









          Name      Value     Range     Interpretation Code Description Data Janey

rce(s) Supporting 

Document(s)

 

          SARS-CoV-2 Not Detected                               [facility]     

 

                                        This lab was ordered by [employer]

ics and reported by [employer]. 









                    ID                  Date                Data Source

 

                    [id number]           2021 09:00:00 AM EDT [facility]









          Name      Value     Range     Interpretation Code Description Data Janey

rce(s) Supporting 

Document(s)

 

          SARS-CoV-2 Not Detected                               [facility]     

 

                                        This lab was ordered by [employer]

ics and reported by [employer]. 









                    ID                  Date                Data Source

 

                    [id number]           2021 12:00:00 AM EDT [facility]









          Name      Value     Range     Interpretation Code Description Data Janey

rce(s) Supporting 

Document(s)

 

          SARS      Not Detected                               [facility]     

 

                                        This lab was ordered by [employer]-

 Employee and reported by [employer]

 [facility]  [employer]. 









                    ID                  Date                Data Source

 

                    [id number]        2021 12:00:00 AM EDT [facility]









          Name      Value     Range     Interpretation Code Description Data Janey

rce(s) Supporting 

Document(s)

 

          SARS coronavirus 2 Ag Negative                                [facility]  

   

 

                                        This lab was ordered by [facility] and rep

orted by [employer]. 









                    ID                  Date                Data Source

 

                    [id number]        2021 12:00:00 AM EDT [facility]









          Name      Value     Range     Interpretation Code Description Data Janey

rce(s) Supporting 

Document(s)

 

          SARS coronavirus 2 Ag Negative                                [facility]  

   

 

                                        This lab was ordered by [facility] and rep

orted by [employer]. 









                    ID                  Date                Data Source

 

                    [id number]           2021 11:55:00 AM EDT [facility]









          Name      Value     Range     Interpretation Code Description Data Janey

rce(s) Supporting 

Document(s)

 

          SARS-CoV-2 Not Detected                               [facility]     

 

                                        This lab was ordered by [employer]

ics and reported by [employer]. 









                    ID                  Date                Data Source

 

                    [id number]        2021 12:00:00 AM EDT [facility]









          Name      Value     Range     Interpretation Code Description Data Janey

rce(s) Supporting 

Document(s)

 

          SARS coronavirus 2 Ag Negative                                [facility]  

   

 

                                        This lab was ordered by [facility] and rep

orted by [facility]. 









                    ID                  Date                Data Source

 

                    [id number]        2021 12:00:00 AM EDT [facility]









          Name      Value     Range     Interpretation Code Description Data Janey

rce(s) Supporting 

Document(s)

 

          SARS coronavirus 2 Ag Negative                                [facility]  

   

 

                                        This lab was ordered by [facility] and rep

orted by [facility]. 









                    ID                  Date                Data Source

 

                    WET PREP            2021 12:00:00 AM EDT eCW1 ([facility])









          Name      Value     Range     Interpretation Code Description Data Janey

rce(s) Supporting 

Document(s)

 

                    pos                           whiff     eCW1 ([facility])  

 

                    pos                           Clue Cells eCW1 ([facility])  

 

                    5.5                           PH        eCW1 ([facility])  

 

                    neg                           Trichomoniads eCW1 ([facility])  

 

                    neg                           Hypae     eCW1 ([facility])  

 

                                                  WET PREP  eCW1 ([facility])  

 

                    neg                           Lactobacillus eCW1 ([facility])  

 

                    neg                           WBC       eCW1 ([facility])  

 

                    few                           RBC       eCW1 ([facility])  









                    ID                  Date                Data Source

 

                    CHLAMYDIA & GC DNA PROBES 2021 12:00:00 AM EDT eCW1 ([facility])









          Name      Value     Range     Interpretation Code Description Data Janey

rce(s) Supporting 

Document(s)

 

                    Negative  Negative            GC DNA PROBE eCW1 ([facility])  

 

                    Negative  Negative            CHLAMYDIA DNA PROBE eCW1 ([facility]) 

 









                    ID                  Date                Data Source

 

                    [id number]           2021 06:00:00 PM EDT [facility]









          Name      Value     Range     Interpretation Code Description Data Janey

rce(s) Supporting 

Document(s)

 

          SARS-CoV-2 Not Detected                               [facility]     

 

                                        This lab was ordered by [employer] and reported by [facility]. 









                    ID                  Date                Data Source

 

                    [id number]           2021 11:00:00 AM EDT [facility]









          Name      Value     Range     Interpretation Code Description Data Janey

rce(s) Supporting 

Document(s)

 

          SARS-CoV-2 Not Detected                               [facility]     

 

                                        This lab was ordered by [employer] and reported by [facility]. 









                    ID                  Date                Data Source

 

                    [id number]           2021 10:43:00 AM EDT [facility]









          Name      Value     Range     Interpretation Code Description Data Janey

rce(s) Supporting 

Document(s)

 

          SARS-CoV-2 Not Detected                               [facility]     

 

                                        This lab was ordered by [employer] and reported by [facility]. 









                    ID                  Date                Data Source

 

                    [id number]           2021 10:24:00 AM EDT [facility]









          Name      Value     Range     Interpretation Code Description Data Janey

rce(s) Supporting 

Document(s)

 

          SARS-CoV-2 Not Detected                               [facility]     

 

                                        This lab was ordered by [employer] and reported by [employer]. 









                    ID                  Date                Data Source

 

                    [id number]           2021 09:52:00 AM EDT [facility]









          Name      Value     Range     Interpretation Code Description Data Janey

rce(s) Supporting 

Document(s)

 

          SARS-CoV-2 Not Detected                               [facility]     

 

                                        This lab was ordered by [employer] and reported by [facility]. 









                    ID                  Date                Data Source

 

                    [id number]           2021 03:24:00 PM EDT [facility]









          Name      Value     Range     Interpretation Code Description Data Janey

rce(s) Supporting 

Document(s)

 

          SARS-CoV-2 Not Detected                               [facility]     

 

                                        This lab was ordered by [employer] and reported by [facility]. 









                    ID                  Date                Data Source

 

                    [id number]        2021 12:00:00 AM EDT [facility]









          Name      Value     Range     Interpretation Code Description Data Janey

rce(s) Supporting 

Document(s)

 

          SARS coronavirus 2 Ag Negative                                [facility]  

   

 

                                        This lab was ordered by [place] and rep

orted by [employer]. 









                    ID                  Date                Data Source

 

                    [id number]           2021 12:00:00 AM EDT [facility]









          Name      Value     Range     Interpretation Code Description Data Janey

rce(s) Supporting 

Document(s)

 

          SARS      Not Detected                               [facility]     

 

                                        This lab was ordered by [facility] 

for [facility]-EMPLOYEE and reported

 by [employer]. 









                    ID                  Date                Data Source

 

                    [id number]           2021 12:00:00 AM EDT [facility]









          Name      Value     Range     Interpretation Code Description Data Janey

rce(s) Supporting 

Document(s)

 

          SARS      Not Detected                               [facility]     

 

                                        This lab was ordered by [facility]-

 Employee and reported by [employer]. 









                    ID                  Date                Data Source

 

                    [id number]        2021 12:00:00 AM EDT [facility]









          Name      Value     Range     Interpretation Code Description Data Janey

rce(s) Supporting 

Document(s)

 

          SARS coronavirus 2 Ag Negative                                [facility]  

   

 

                                        This lab was ordered by [place] and rep

orted by [employer]. 









                    ID                  Date                Data Source

 

                    [id number]        2021 12:00:00 AM EDT [facility]









          Name      Value     Range     Interpretation Code Description Data Janey

rce(s) Supporting 

Document(s)

 

          SARS coronavirus 2 Ag Negative                                [facility]  

   

 

                                        This lab was ordered by [facility] and rep

orted by [facility]. 









                    ID                  Date                Data Source

 

                    [id number]           2021 01:55:00 PM EDT [facility]









          Name      Value     Range     Interpretation Code Description Data Janey

rce(s) Supporting 

Document(s)

 

          SARS-CoV-2 Not Detected                               [facility]     

 

                                        This lab was ordered by [employer]

[employer] and reported by [facility]. 









                    ID                  Date                Data Source

 

                    [id number]        2021 12:00:00 AM EDT [facility]









          Name      Value     Range     Interpretation Code Description Data Janey

rce(s) Supporting 

Document(s)

 

          SARS coronavirus 2 Ag Negative                                [facility]  

   

 

                                        This lab was ordered by [facility] and rep

orted by [facility]. 









                    ID                  Date                Data Source

 

                    [id number]           2021 09:49:00 AM EDT [facility]









          Name      Value     Range     Interpretation Code Description Data Janey

rce(s) Supporting 

Document(s)

 

          SARS-CoV-2 Not Detected                               [facility]     

 

                                        This lab was ordered by [employer]

[employer] and reported by [facility]. 









                    ID                  Date                Data Source

 

                    [id number]        2021 12:00:00 AM EDT [facility]









          Name      Value     Range     Interpretation Code Description Data Janey

rce(s) Supporting 

Document(s)

 

          SARS coronavirus 2 Ag Negative                                [facility]  

   

 

                                        This lab was ordered by [facility] and rep

orted by [facility]. 









                    ID                  Date                Data Source

 

                    [id number]           2021 02:36:00 PM EDT [facility]









          Name      Value     Range     Interpretation Code Description Data Janey

rce(s) Supporting 

Document(s)

 

          SARS-CoV-2 Not Detected                               [facility]     

 

                                        This lab was ordered by [employer]

[place] and reported by [facility]. 









                    ID                  Date                Data Source

 

                    [id number]           2021 01:51:00 PM EDT [facility]









          Name      Value     Range     Interpretation Code Description Data Janey

rce(s) Supporting 

Document(s)

 

          SARS-CoV-2 Not Detected                               [facility]     

 

                                        This lab was ordered by [employer]

[employer] and reported by [facility]. 









                    ID                  Date                Data Source

 

                    [id number]        2021 12:00:00 AM EDT [facility]









          Name      Value     Range     Interpretation Code Description Data Janey

rce(s) Supporting 

Document(s)

 

          SARS coronavirus 2 Ag Negative                                [facility]  

   

 

                                        This lab was ordered by [place] and rep

orted by [place]. 









                    ID                  Date                Data Source

 

                    [id number]        2021 12:00:00 AM EDT [facility]









          Name      Value     Range     Interpretation Code Description Data Janey

rce(s) Supporting 

Document(s)

 

          SARS coronavirus 2 Ag Negative                                [facility]  

   

 

                                        This lab was ordered by [place] and rep

orted by [place]. 









                    ID                  Date                Data Source

 

                    [id number]           2021 12:00:00 AM EDT [facility]









          Name      Value     Range     Interpretation Code Description Data Janey

rce(s) Supporting 

Document(s)

 

          SARS      Not Detected                               [facility]     

 

                                        This lab was ordered by [facility] [place]-

 Employee and reported by [facility] [place]

 [facility]  [employer]. 









                    ID                  Date                Data Source

 

                    [id number]        03/15/2021 12:00:00 AM EDT [facility]









          Name      Value     Range     Interpretation Code Description Data Janey

rce(s) Supporting 

Document(s)

 

          SARS coronavirus 2 Ag Negative                                [facility]  

   

 

                                        This lab was ordered by [place] and rep

orted by [facility]. 









                    ID                  Date                Data Source

 

                    [id number]        2021 12:00:00 AM EST [facility]









          Name      Value     Range     Interpretation Code Description Data Janey

rce(s) Supporting 

Document(s)

 

          SARS coronavirus 2 Ag Negative                                [facility]  

   

 

                                        This lab was ordered by [place] and rep

orted by [facility]. 









                    ID                  Date                Data Source

 

                    [id number]        2021 12:00:00 AM EST [facility]









          Name      Value     Range     Interpretation Code Description Data Janey

rce(s) Supporting 

Document(s)

 

          SARS coronavirus 2 Ag Negative                                [facility]  

   

 

                                        This lab was ordered by [place] and rep

orted by [facility]. 









                    ID                  Date                Data Source

 

                    [id number]        2021 12:00:00 AM EST [facility]









          Name      Value     Range     Interpretation Code Description Data Janey

rce(s) Supporting 

Document(s)

 

          SARS coronavirus 2 pcr Negative                                [facility] 

    

 

                                        This lab was ordered by [place] and rep

orted by [facility]. 









                    ID                  Date                Data Source

 

                    [id number]        2021 12:00:00 AM EST [facility]









          Name      Value     Range     Interpretation Code Description Data Janey

rce(s) Supporting 

Document(s)

 

          SARS coronavirus 2 pcr Negative                                [facility] 

    

 

                                        This lab was ordered by [facility] and rep

orted by [facility]. 









                    ID                  Date                Data Source

 

                    [id number]        2021 12:00:00 AM EST [facility]









          Name      Value     Range     Interpretation Code Description Data Janey

rce(s) Supporting 

Document(s)

 

          SARS coronavirus 2 pcr Negative                                [facility] 

    

 

                                        This lab was ordered by [facility] and rep

orted by [facility]. 









                    ID                  Date                Data Source

 

                    [id number]        2021 12:00:00 AM EST [facility]









          Name      Value     Range     Interpretation Code Description Data Janey

rce(s) Supporting 

Document(s)

 

          SARS coronavirus 2 pcr Negative                                [facility] 

    

 

                                        This lab was ordered by [facility] and rep

orted by [facility]. 









                    ID                  Date                Data Source

 

                    [id number]        02/15/2021 12:00:00 AM EST [facility]









          Name      Value     Range     Interpretation Code Description Data Janey

rce(s) Supporting 

Document(s)

 

          SARS coronavirus 2 pcr Negative                                [facility] 

    

 

                                        This lab was ordered by [facility] and rep

orted by [facility]. 









                    ID                  Date                Data Source

 

                    [id number]           2021 12:00:00 AM EST [facility]









          Name      Value     Range     Interpretation Code Description Data Janey

rce(s) Supporting 

Document(s)

 

          SARS      Not Detected                               [facility]     

 

                                        This lab was ordered by [facility]-

 Employee and reported by [facility] [place]

 [facility]  [employer]. 









                    ID                  Date                Data Source

 

                    [id number]        2021 12:00:00 AM EST [facility]









          Name      Value     Range     Interpretation Code Description Data Janey

rce(s) Supporting 

Document(s)

 

          SARS coronavirus 2 pcr Negative                                [facility] 

    

 

                                        This lab was ordered by [facility] and rep

orted by [facility]. 









                    ID                  Date                Data Source

 

                    [id number]           2021 05:00:00 PM EST [facility]









          Name      Value     Range     Interpretation Code Description Data Janey

rce(s) Supporting 

Document(s)

 

          SARS-CoV-2 Not Detected                               [facility]     

 

                                        This lab was ordered by [employer]

[facility] and reported by [facility]. 









                    ID                  Date                Data Source

 

                    [id number]          2021 12:00:00 AM EST [facility]









          Name      Value     Range     Interpretation Code Description Data Janey

rce(s) Supporting 

Document(s)

 

          SARS coronavirus 2 (SARS-CoV-2) Negative                              

  [facility]     

 

                                        This lab was ordered by [employer] and reported by [employer].

 









                    ID                  Date                Data Source

 

                    [id number]          2021 12:00:00 AM EST [facility]









          Name      Value     Range     Interpretation Code Description Data Janey

rce(s) Supporting 

Document(s)

 

          SARS coronavirus 2 (SARS-CoV-2) Negative                              

  [facility]     

 

                                        This lab was ordered by [employer] and reported by [employer].

 









                    ID                  Date                Data Source

 

                    [id number]           2020 12:00:00 AM EST [facility]









          Name      Value     Range     Interpretation Code Description Data Janey

rce(s) Supporting 

Document(s)

 

          SARS      Not Detected                               [facility]     

 

                                        This lab was ordered by [employer]-

 Employee and reported by [employer]. 









                    ID                  Date                Data Source

 

                    [id number]           2020 03:29:00 PM EST [facility]









          Name      Value     Range     Interpretation Code Description Data Janey

rce(s) Supporting 

Document(s)

 

          SARS-CoV-2                                         [facility]     

 

                                        This lab was ordered by [employer] and reported by [employer]. 









                    ID                  Date                Data Source

 

                    [id number]           2020 07:58:00 AM EST [facility]









          Name      Value     Range     Interpretation Code Description Data Janey

rce(s) Supporting 

Document(s)

 

          SARS-CoV-2                                         [facility]     

 

                                        This lab was ordered by [employer] and reported by [employer]. 









                    ID                  Date                Data Source

 

                    [id number]           2020 03:16:00 PM EST [facility]









          Name      Value     Range     Interpretation Code Description Data Janey

rce(s) Supporting 

Document(s)

 

          SARS-CoV-2                                         [facility]     

 

                                        This lab was ordered by [employer] and reported by [employer]. 









                    ID                  Date                Data Source

 

                    [id number]           2020 12:00:00 AM EST [facility]









          Name      Value     Range     Interpretation Code Description Data Janey

rce(s) Supporting 

Document(s)

 

          SARS                                              [facility]     

 

                                        This lab was ordered by [employer]-

 Employee and reported by [employer]. 









                    ID                  Date                Data Source

 

                    [id number]           10/28/2020 12:00:00 AM EDT [facility]









          Name      Value     Range     Interpretation Code Description Data Janey

rce(s) Supporting 

Document(s)

 

          SARS                                              [facility]     

 

                                        This lab was ordered by [employer]-

 Employee and reported by [employer]. 









                    ID                  Date                Data Source

 

                    [id number]           10/26/2020 12:00:00 AM EDT [facility]









          Name      Value     Range     Interpretation Code Description Data Janey

rce(s) Supporting 

Document(s)

 

          SARS                                              [facility]     

 

                                        This lab was ordered by [employer] [facility]-

 Employee and reported by [employer]. 









                    ID                  Date                Data Source

 

                    [id number]           10/14/2020 01:36:00 PM EDT [facility]









          Name      Value     Range     Interpretation Code Description Data Janey

rce(s) Supporting 

Document(s)

 

          SARS-CoV-2                                         [facility]     

 

                                        This lab was ordered by [employer] and reported by [facility]. 









                    ID                  Date                Data Source

 

                    [id number]           10/07/2020 03:10:00 PM EDT [facility]









          Name      Value     Range     Interpretation Code Description Data Janey

rce(s) Supporting 

Document(s)

 

          SARS-CoV-2                                         [facility]     

 

                                        This lab was ordered by [employer] and reported by [facility]. 









                    ID                  Date                Data Source

 

                    [id number]           2020 12:12:00 PM EDT [facility]









          Name      Value     Range     Interpretation Code Description Data Janey

rce(s) Supporting 

Document(s)

 

          SARS-CoV-2                                         [facility]     

 

                                        This lab was ordered by [employer] and reported by [facility]. 









                    ID                  Date                Data Source

 

                    [id number]           2020 10:14:00 AM EDT [facility]









          Name      Value     Range     Interpretation Code Description Data Janey

rce(s) Supporting 

Document(s)

 

          SARS-CoV-2                                         [facility]     

 

                                        This lab was ordered by [employer] and reported by [facility]. 









                    ID                  Date                Data Source

 

                    [id number]           2020 03:40:00 PM EDT [facility]









          Name      Value     Range     Interpretation Code Description Data Janey

rce(s) Supporting 

Document(s)

 

          SARS-CoV-2                                         [facility]     

 

                                        This lab was ordered by [employer] and reported by [facility]. 









                    ID                  Date                Data Source

 

                    [id number]           2020 04:30:00 PM EDT [facility]









          Name      Value     Range     Interpretation Code Description Data Janey

rce(s) Supporting 

Document(s)

 

          SARS-CoV-2                                         [facility]     

 

                                        This lab was ordered by [employer] and reported by [facility]. 









                    ID                  Date                Data Source

 

                    [id number]           2020 12:00:00 AM EDT [facility]









          Name      Value     Range     Interpretation Code Description Data Janey

rce(s) Supporting 

Document(s)

 

          2019-nCoV RNA XXX VERA+probe-Imp                                       

  [facility]     

 

                                        This lab was ordered by [employer] and repo

rted by [employer] 







                                        Procedure

 

                                          



                                                                                
                                                                                
                                                                                
                                                                                
                                                                                
                                                                                
                                                                                
                                                                                
                                                                                
                                                                                
                                                                                
                                      



Social History

          



           Code       Duration   Value      Status     Description Data Source(s

)

 

                      10/12/2021 12:00:00 AM EDT Current non-smoker completed  C

urrent non-smoker 

[facility] ([facility])

 

           Smoking    10/12/2021 12:00:00 AM EDT Never smoker completed  Never s

moker [facility] 

([facility])

 

           Smoking    2021 12:00:00 AM EDT Never Smoker completed  Never S

moker eCW1 

([facility])

 

           Smoking    2021 12:00:00 AM EDT Never Smoker completed  Never S

moker eCW1 

([facility])

 

           Smoking    2021 12:00:00 AM EDT Never Smoker completed  Never S

moker eCW1 

([facility])

 

           Smoking    2021 12:00:00 AM EDT Never Smoker completed  Never S

moker eCW1 

([facility])



                                                                                
                                                                    



Vital Signs

          



                    ID                  Date                Data Source

 

                    UNK                                      









           Name       Value      Range      Interpretation Code Description Data

 Source(s)

 

           Body height 160.02 cm                        160.02 cm  [facility]

 

           Body weight 65.227 kg                        65.227 kg  [facility]

 

           Systolic blood pressure 120 mm[Hg]                       120 mm[Hg] N

[facility] ([facility]

 

           Diastolic blood pressure 78 mm[Hg]                        78 mm[Hg]  

[facility] ([facility])

 

           Body mass index (BMI) [Ratio] 25.47 kg/m2            Overweight 25.47

 kg/m2 [facility] 

([facility])

 

           Body weight 138 [lb_av]                       138 [lb_av] eCW1 ([facility])

 

           Body weight 62.6 kg                          62.6 kg    eCW1 ([facility])

 

           Body height 63 [in_i]                        63 [in_i]  eCW1 ([facility])

 

           Body mass index (BMI) [Ratio] 24.44 kg/m2                       24.44

 kg/m2 eCW1 ([facility])

 

           Systolic blood pressure 118 mm[Hg]                       118 mm[Hg] e

CW1 ([facility])

 

           Diastolic blood pressure 76 mm[Hg]                        76 mm[Hg]  

eCW1 ([facility])

 

           Body weight 137.2 [lb_av]                       137.2 [lb_av] eCW1 ([facility])

 

           Diastolic blood pressure 78 mm[Hg]                        78 mm[Hg]  

eCW1 ([facility])

 

           Body height 63 [in_i]                        63 [in_i]  eCW1 ([facility])

 

           Body mass index (BMI) [Ratio] 24.30 kg/m2                       24.30

 kg/m2 eCW1 ([facility])

 

           Systolic blood pressure 110 mm[Hg]                       110 mm[Hg] e

CW1 ([facility])

 

           Body weight 140 [lb_av]                       140 [lb_av] eCW1 ([facility])

 

           Body weight 63.5 kg                          63.5 kg    eCW1 ([facility])

 

           Systolic blood pressure 114 mm[Hg]                       114 mm[Hg] e

CW1 ([facility])

 

           Body height 63 [in_i]                        63 [in_i]  eCW1 ([facility])

 

           Body mass index (BMI) [Ratio] 24.8 kg/m2                       24.8 k

g/m2 eCW1 ([facility])

 

           Diastolic blood pressure 74 mm[Hg]                        74 mm[Hg]  

eCW1 ([facility])



                                                                                
                  



Patient Treatment Plan of Care

          



             Planned Activity Planned Date Details      Description  Data Source

(s)

 

                Metronidazole 0.0075 MG/MG Vaginal Gel [[facility]el] 10/12/2021 12:

00:00 AM EDT                  

                                        [facility]

 

             Metronidazole 0.0075 MG/MG Vaginal Gel 2021 12:00:00 AM EDT  

                         eCW1 

([facility])

 

             Terbinafine HCl 1 % 2021 12:00:00 AM EDT                     

      eCW1 ([facility])

 

             Naftifine hydrochloride 10 MG/ML Topical Cream 2021 12:00:00 

AM EDT                           

eCW1 ([facility])

 

             Terbinafine HCl 1 % 2021 12:00:00 AM EDT                     

      eCW1 ([facility])

 

             Naftifine hydrochloride 10 MG/ML Topical Cream 2021 12:00:00 

AM EDT                           

eCW1 ([facility])

 

             Fluconazole 150 MG Oral Tablet 2021 12:00:00 AM EDT          

                 eCW1 ([facility])

## 2021-10-23 NOTE — CCD
Summarization Of Episode

                             Created on: 10/23/2021



EVETTE CASTANEDA

External Reference #: [id number]

: 2000

Sex: Undifferentiated



Demographics





                          Address                   [address] [place] [address]

 

                          Home Phone                [phone]

 

                          Preferred Language        English

 

                          Marital Status            Unknown

 

                          Scientology Affiliation     Unknown

 

                          Race                      Unknown

 

                          Ethnic Group              Not  or 





Author





                          Author                    [facility]

 

                          Organization              [facility]

 

                          Address                   Unknown

 

                          Phone                     Unavailable







Support





                Name            Relationship    Address         Phone

 

                    [facility]     [place] [address]                     Unavailable

 

                    Azucena Bull Next Of Kin         [address] [place] [address]                    [phone]

 

                    Arianna Aldana Next Of Kin         [address] [place] [address]                    [phone]

 

                    Shaista HOANG,  Cynthia Next Of Kin         [address] [place], [place]  [id number]                [phone]

 

                ST              Next Of Kin     Unknown         Unavailable

 

                UE              Next Of Kin     Unknown         Unavailable

 

                    FAITH KAT Next Of Kin         [address] [place] [id number]                     [phone]

 

                    SWETA MCNAIR        Next Of Kin         [address] 

                                        [address] [place]  [address]                     [phone]

 

                    KAREN MCNAIR      Next Of Kin         [address] [address]                     [phone]

 

                    Donn RAO,  Blanche   Next Of Kin         [address] [place] [address]                    [phone]

 

                    SWETA Love MD Next Of Kin         [address] [place] [address]  [address]               [phone]

 

                    SWETA MCNAIR        ECON                [address] [place]  [address]                      Unavailable

 

                    LIBERTY MCNAIR     ECON                [address] [place] [address]                     Unavailable







Care Team Providers





                    Care Team Member Name Role                Phone

 

                    KARAN Moore MD   Unavailable         Unavailable

 

                    KARAN Moore MD   Unavailable         Unavailable

 

                    KARAN Moore MD   Unavailable         Unavailable

 

                    KARAN Moore MD   Unavailable         Unavailable

 

                    KARAN Moore MD   Unavailable         Unavailable

 

                    KARAN Moore MD   Unavailable         Unavailable

 

                    KARAN Moore MD   Unavailable         Unavailable

 

                    KARAN Moore MD   Unavailable         Unavailable

 

                    KARAN Moore MD   Unavailable         Unavailable

 

                    KARAN Moore MD   Unavailable         Unavailable

 

                    KARAN Moore MD   Unavailable         Unavailable

 

                    KARAN Moore MD   Unavailable         Unavailable

 

                    KARAN Moore MD   Unavailable         Unavailable

 

                    KARAN Moore MD   Unavailable         Unavailable

 

                    KARAN Moore MD   Unavailable         Unavailable

 

                    KARAN Moore MD   Unavailable         Unavailable

 

                    KARAN Moore MD   Unavailable         Unavailable

 

                    KARAN Moore MD   Unavailable         Unavailable

 

                    KARAN Moore MD   Unavailable         Unavailable

 

                    KARAN Moore MD   Unavailable         Unavailable

 

                    KARAN Moore MD   Unavailable         Unavailable

 

                    KARAN Moore MD   Unavailable         Unavailable

 

                    KARAN Moore MD   Unavailable         Unavailable

 

                    KARAN Moore MD   Unavailable         Unavailable

 

                    KARAN Moore MD   Unavailable         Unavailable

 

                    KARAN Moore MD   Unavailable         Unavailable

 

                    KARAN Moore MD   Unavailable         Unavailable

 

                    KARAN Moore MD   Unavailable         Unavailable

 

                    KARAN Moore MD   Unavailable         Unavailable

 

                    KARAN Moore MD   Unavailable         Unavailable

 

                    KARAN Moore MD   Unavailable         Unavailable

 

                    KARAN Moore MD   Unavailable         Unavailable

 

                    KARAN Moore MD   Unavailable         Unavailable

 

                    KARAN Moore MD   Unavailable         Unavailable

 

                    KARAN Moore MD   Unavailable         Unavailable

 

                    KARAN Moore MD   Unavailable         Unavailable

 

                    KARAN Moore MD   Unavailable         Unavailable

 

                    KARAN Moore MD   Unavailable         Unavailable

 

                    KARAN Moore MD   Unavailable         Unavailable

 

                    KARAN Moore MD   Unavailable         Unavailable

 

                    KARAN Moore MD   Unavailable         Unavailable

 

                    KARAN Moore MD   Unavailable         Unavailable

 

                    KARAN Moore MD   Unavailable         Unavailable

 

                    KARAN Moore MD   Unavailable         Unavailable

 

                    KARAN Moore MD   Unavailable         Unavailable

 

                    KARAN Moore MD   Unavailable         Unavailable

 

                    KARAN Moore MD   Unavailable         Unavailable

 

                    KARAN Moore MD   Unavailable         Unavailable

 

                    KARAN Moore MD   Unavailable         Unavailable

 

                    KARAN Moore MD   Unavailable         Unavailable

 

                    KARAN Moore MD   Unavailable         Unavailable

 

                    KARAN Moore MD   Unavailable         Unavailable

 

                    KARAN Moore MD   Unavailable         Unavailable

 

                    KARNA Moore MD   Unavailable         Unavailable

 

                    KARAN Moore MD   Unavailable         Unavailable

 

                    KARAN Moore MD   Unavailable         Unavailable

 

                    KARAN Moore MD   Unavailable         Unavailable

 

                    KARAN Moore MD   Unavailable         Unavailable

 

                    KARAN Moore MD   Unavailable         Unavailable

 

                    KARAN Moore MD   Unavailable         Unavailable

 

                    KARAN Moore MD   Unavailable         Unavailable

 

                    KARAN Moore MD   Unavailable         Unavailable

 

                    KARAN Moore MD   Unavailable         Unavailable

 

                    KARAN Moore MD   Unavailable         Unavailable

 

                    KARAN Moore MD   Unavailable         Unavailable

 

                    KARAN Moore MD   Unavailable         Unavailable

 

                    KARAN Moore MD   Unavailable         Unavailable

 

                    KARAN Moore MD   Unavailable         Unavailable

 

                    KARAN Moore MD   Unavailable         Unavailable

 

                    KARAN Moore MD   Unavailable         Unavailable

 

                    KARAN Moore MD   Unavailable         Unavailable

 

                    KARAN Moore MD   Unavailable         Unavailable

 

                    KARAN Moore MD   Unavailable         Unavailable

 

                    KARAN Moore MD   Unavailable         Unavailable

 

                    KARAN Moore MD   Unavailable         Unavailable

 

                    KARAN Moore MD   Unavailable         Unavailable

 

                    KARAN Moore MD   Unavailable         Unavailable

 

                    KARAN Moore MD   Unavailable         Unavailable

 

                    KARAN Moore MD   Unavailable         Unavailable

 

                    KARAN Moore MD   Unavailable         Unavailable

 

                    KARAN Moore MD   Unavailable         Unavailable

 

                    KARAN Moore MD   Unavailable         Unavailable

 

                    KARAN Moore MD   Unavailable         Unavailable

 

                    KARAN Moore MD   Unavailable         Unavailable

 

                    KARAN Moore MD   Unavailable         Unavailable

 

                    KARAN Moore MD   Unavailable         Unavailable

 

                    KARAN Moore MD   Unavailable         Unavailable

 

                    KARAN Moore MD   Unavailable         Unavailable

 

                    KARAN Moore MD   Unavailable         Unavailable

 

                    KARAN Moore MD   Unavailable         Unavailable

 

                    KARAN Moore MD   Unavailable         Unavailable

 

                    KARAN Moore MD   Unavailable         Unavailable

 

                    KARAN Moore MD   Unavailable         Unavailable

 

                    Aleman,  Jack MD        Unavailable         Unavailable

 

                    Aleman,  Jack MD        Unavailable         Unavailable

 

                    Aleman,  Jack MD        Unavailable         Unavailable

 

                    Aleman,  Jack MD        Unavailable         Unavailable

 

                    Aleman,  Jack MD        Unavailable         Unavailable

 

                    Aleman,  Jack MD        Unavailable         Unavailable

 

                    Dwello PA PA,  Irma Unavailable         Unavailable

 

                    Dwello PA PA,  Irma Unavailable         Unavailable

 

                    Dwello PA PA,  Irma Unavailable         Unavailable

 

                    Dwello PA PA,  Irma Unavailable         Unavailable

 

                    Dwello PA PA,  Irma Unavailable         Unavailable

 

                    Dwello PA PA,  Rima Unavailable         Unavailable

 

                    Dwello PA PA,  Irma Unavailable         Unavailable

 

                    NON, PHYSICIAN STAFF Unavailable         Unavailable



                                  



Re-disclosure Warning

          The records that you are about to access may contain information from 
federally-assisted alcohol or drug abuse programs. If such information is 
present, then the following federally mandated warning applies: This information
has been disclosed to you from records protected by federal confidentiality 
rules (42 CFR part 2). The federal rules prohibit you from making any further 
disclosure of this information unless further disclosure is expressly permitted 
by the written consent of the person to whom it pertains or as otherwise 
permitted by 42 CFR part 2. A general authorization for the release of medical 
or other information is NOT sufficient for this purpose. The Federal rules 
restrict any use of the information to criminally investigate or prosecute any 
alcohol or drug abuse patient.The records that you are about to access may 
contain highly sensitive health information, the redisclosure of which is 
protected by Article 27-F of the [facility] Public Health law. If you 
continue you may have access to information: Regarding HIV / AIDS; Provided by 
facilities licensed or operated by the [facility] Office of Mental Health; 
or Provided by the [facility] Office for People With Developmental 
Disabilities. If such information is present, then the following [place] 
mandated warning applies: This information has been disclosed to you from 
confidential records which are protected by state law. State law prohibits you 
from making any further disclosure of this information without the specific 
written consent of the person to whom it pertains, or as otherwise permitted by 
law. Any unauthorized further disclosure in violation of state law may result in
a fine or FPC sentence or both. A general authorization for the release of 
medical or other information is NOT sufficient authorization for further disc
losure.                                                                         
    



Allergies and Adverse Reactions

          



           Type       Description Substance  Reaction   Status     Data Source(s

)

 

           Allergy to substance Allergy to substance Allergy to substance       

                REGIS ([facility])



                                                                                
       



Family History

          



             Family Member Name Family Member Gender Family Member Status Date o

f Status 

Description                             Data Source(s)

 

           Unknown    Male       Diagnosis  2016 12:00:00 AM EDT          

  [facility] ([facility]

 

           Unknown    Male       Diagnosis  2016 12:00:00 AM EDT          

  [facility] ([facility]



                                                                                
       



Encounters

          



           Encounter  Providers  Location   Date       Indications Data Source(s

)

 

                OFFICE VISIT, ESTOutpatient Attender: Irma COV[place] 

10/12/2021 03:15:00 PM EDT - 10/12/2021 03:15:00 PM EDT Encounter for screening 

for human immunodeficiency virusAcute vaginitisOther specified noninflammatory 
disorders of vaginaEncntr screen for infections w sexl mode of 
transmissEncounter for oth general cnsl and advice on contraceptionOther sex 
counselingHuman immunodeficiency virus [HIV] counseling [facility]

 

                                        Encounter for screening for human immuno

deficiency virus 

 

                                        Acute vaginitis 

 

                                        Other specified noninflammatory disorder

s of vagina 

 

                                        Encntr screen for infections w sexl mode

 of transmiss 

 

                                        Encounter for oth general cnsl and advic

e on contraception 

 

                                        Other sex counseling 

 

                                        Human immunodeficiency virus [HIV] couns

[facility] 

 

                Emergency       Attender: Kingsley Aleman MDConsultant: STAFF NON       

          2021 12:04:00 PM EDT

 - 2021 12:51:00 PM EDT                           [facility]

 

                                        Patient discharged. 

 

                Out[place]                      [address] [place] [address] 2021 12:00:00 AM

 EDT                                                eCW1 (formerly [facility])

 

                Outpatient                      [address] [place] [address] 2021 12:00:00 AM

 EDT                                                eCW1 (formerly [facility])

 

                Unknown                         [address] [place] [address] 2021 12:00:00 AM 

EDT                                                 eCW1 (formerly [facility])

 

                ([facility] PROC) [facility]                 [address] [place] [address] 

2021 12:00:00 AM EDT                           eCW1 ([facility])

 

                          Blake Moore MD: [address] [place] [address]

[address], Ph. [phone] 

Attender: Blake Moore MD  [facility] 

12/10/2020 12:00:00 AM EST                           REGIS ([facility])



                                                                                
                                                                    



Medications

          



          Medication Brand Name Start Date Product Form Dose      Route     Admi

nistrative 

Instructions Pharmacy Instructions Status     Indications Reaction   Description

 Data 

Source(s)

 

                          Metronidazole 0.0075 MG/MG Vaginal Gel [MetroGel] Metr

ogel Vaginal 0.75 % 

Metrogel Vaginal 0.75 % 10/12/2021 12:00:00 AM EDT                              

      active               

metronidazole 0.0075 MG/MG Vaginal Gel [MetroGel] [facility] ([facility])

 

          1 mg                2021 12:00:00 AM EDT tablet    90           

       TAKE 1 TABLET BY MOUTH 1-3 TIMES A

 DAY       TAKE 1 TABLET BY MOUTH 1-3 TIMES A DAY SOLD: 2021              

                    [email or web address]

 

                          Metronidazole 0.0075 MG/MG Vaginal Gel Metronidazole 0

.75 % Metronidazole 0.75 %

      2021 12:00:00 AM EDT                               active           

  Metronidazole 0.75 % [facility] 

[facility]

 

          0.75 %              2021 12:00:00 AM EDT gel       70           

       INSERT 1 APPLICATORFUL VAGINALLY AT

 BEDTIME FOR 5 DAYS       INSERT 1 APPLICATORFUL VAGINALLY AT BEDTIME FOR 5 DAYS

 SOLD:

 2021                                                     [facility]

 

             Terbinafine HCl 1 % Terbinafine HCl 1 % 2021 12:00:00 AM EDT 

             1.0 

{application}                         active                  Terbinafine HCl 1 

% eCW1 ([facility]

 

                          Naftifine hydrochloride 10 MG/ML Topical Cream Naftifi

ne HCl 1 % Naftifine HCl 1

 %     2021 12:00:00 AM EDT        1.0 {application}                      

active               Naftifine HCl

 1 %                                    eCW1 [facility]

 

                          Naftifine hydrochloride 10 MG/ML Topical Cream Naftifi

ne HCl 1 % Naftifine HCl 1

 %     2021 12:00:00 AM EDT        1.0 {application}                      

active               Naftifine HCl

 1 %                                    [facility]CW[facility]

 

        1 %             2021 12:00:00 AM EDT cream   30              APPLY

 TOPICALLY ONCE DAILY APPLY 

TOPICALLY ONCE DAILY SOLD: 2021                                        [facility]

 

             Terbinafine HCl 1 % Terbinafine HCl 1 % 2021 12:00:00 AM EDT 

             1.0 

{application}                         active                  Terbinafine HCl 1 

% eCW1 [facility]

 

             Terbinafine HCl 1 % Terbinafine HCl 1 % 2021 12:00:00 AM EDT 

             1.0 

{application}                         active                          eCW1 ([facility])

 

                          Naftifine hydrochloride 10 MG/ML Topical Cream Naftifi

ne HCl 1 % Naftifine HCl 1

 %    2021 12:00:00 AM EDT       1.0 {application}                   activ

e                   eCW1 

([facility]

 

             Fluconazole 150 MG Oral Tablet Fluconazole 150 MG 2021 12:00:

00 AM EDT              

1.0 {tablet}                         suspended                 Fluconazole 150 M

G eCW1 ([facility]

 

             Fluconazole 150 MG Oral Tablet Fluconazole 150 MG 2021 12:00:

00 AM EDT              

1.0 {tablet}                         active                  Fluconazole 150 MG 

eCW1 ([facility]

 

             Fluconazole 150 MG Oral Tablet Fluconazole 150 MG 2021 12:00:

00 AM EDT              

1.0 {tablet}                         suspended                 Fluconazole 150 M

G eCW1 ([facility]

 

             Fluconazole 150 MG Oral Tablet Fluconazole 150 MG 2021 12:00:

00 AM EDT              

1.0 {tablet}                         suspended                         eCW1 ([facility])

 

          90 mcg/actuation           10/11/2019 12:00:00 AM EDT HFA aerosol inha

ler 8                   INHALE TWO

 PUFFS BY MOUTH EVERY 4 HOURS AS NEEDED FOR COUGHING CHEST TIGHTNESS WHEEZING OR
 FOR SHORTNESS OF BREATH                INHALE TWO PUFFS BY MOUTH EVERY 4 HOURS 

AS NEEDED FOR 

COUGHING CHEST TIGHTNESS WHEEZING OR FOR SHORTNESS OF BREATH SOLD: 2020   

                     

                                                    [facility]



                                                                                
                                                                                
                                                          



Insurance Providers

          



             Payer name   Policy type / Coverage type Policy ID    Covered party

 ID Covered 

party's relationship to rivera Policy Rivera             Plan Information

 

          Medicaid  S         [id number]            S                   [id number]

 

          Medicaid  S         [id number]            S                   [id number]

 

          Managed Care - Community Plan [facility] P         [id number]   

        S                   [id number]

 

          Medicaid  S         [id number]            S                   [id number]

 

          Managed Care - Community Plan [facility] P         [id number]   

        S                   [id number]

 

          [facility]       I         [id number]           Self                [id number]

 

          Managed Care - Community Plan [facility] P         [id number]   

        S                   [id number]

 

          [facility]   [id number] [place]                [id number]

 

             [facility] Community Plan Commercial                [id number]

[id number] Self

                                                     

 

          Medicaid  S         [id number]            S                   [id number]

 

          Medicaid  S         [id number]            S                   [id number]

 

          Managed Care - Community Plan [facility] P         [id number]   

        S                   [id number]

 

          Medicaid  P         [id number]            S                   [id number]

 

          [facility] MEDICAID           [id number]           S            

       [id number]

 

          Managed Care - [facility] Community Plan P         [id number]           S     

              [id number]

 

          Managed Care - Community Plan [facility] P         [id number]   

        S                   [id number]

 

                [facility] Community Plan Commercial      [id number]       

[id number] Family Dependent                   

     [id number]

 

          Medicaid  S         [id number]            S                   [id number]

 

          [facility]             [place]   [id number] self                [id number]

 

          Managed Care - [facility] Community Plan P         [id number]           S     

              [id number]

 

          MEDICAID            [id number]            S                   [id number]

 

                    ANSI-Medicaid [id number]          

                     

[id number]

 

                    ANSI-Medicaid [id number]          

                     

[id number]

 

          [facility]           [id number]           SP           

       [id number]

 

          Managed Care - [facility] Community Plan P         [id number]           S     

              [id number]

 

                    ANSI-Medicaid [id number]          

                     

[id number]

 

                    ANSI-Medicaid [id number]          

                     

[id number]

 

          [employer]HC COMMUNITY PLAN MCDO           [id number]           SP           

       [id number]

 

          MEDICAID            [id number]            SP                  [id number]

 

                    ANSI-Medicaid [id number]          

                     

[id number]

 

          SELF PAY ONLY           [id number]           SP                  [id number]

[id number]

 

          MEDICAID            [id number]           [facility]                 [id number]

[id number]

 

          [facility]         [id number] S              

     [id number]

 

          MEDICAID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

 

                                                                       









                    ID                  Date                Data Source

 

                    [id number]      2021 12:04:00 PM EDT [facility]

 

                                                                                

                                        

                                       1                                       
Clinical Report - Nurses                                       [facility]                                      Emergency Department              
                  [address] [place] [address]                          
        Phone #: [phone]- [phone]                                       
   2021 12:04                        
----------------------------------------------------                            
          Patient: EVETTE MCNAIR                                  MRN: [id number]   
   Acct#: [id number]                                Sex: F : 2000 Age: 
20yTRIAGEAcuity: LEVEL 4.Chief Complaint: BURN TO LEFT HAND, LEFT INDEX FINGER, 
LEFT MIDDLE FINGER, LEFT RINGFINGER and LEFT LITTLE FINGER FROM HOT 
LIQUID.Alert. No acute distress.Location of injuries: left index finger, left 
middle finger, left ring finger and left little finger. Occurred 
11:5607. ( Pt was at work and was pouring gravy when she spilled some on
 her left hand. She haspain in all four fingers.).Treatment PTA:Applied 
ice.SEPSIS SCREEN: SIRS SCREEN NEGATIVE. SEPSIS SCREEN NEGATIVE. No suspected or
 confirmedsigns of infection present.MICHELE COMA SCORE: 15- eyes open- 
spontaneous (4); best verbal response- oriented (5); bestmotor response- obeys 
commands (6). --12:14 21 Nayeli Rivas R.N.12:10 21. BP: 131/71. MAP:
 91. HR: 72. RR: 16. O2 saturation: 100% on room air. Pain level now:4/10. 
--12:14 21 Nayeli Rivas R.N.12:16 21. Temp: 98.2 F (oral). --12:17 
21 Nayeli Rivas R.N.Weight: 63.5 kg stated. Height/Length: 63 inches Per 
Patient. BMI: 24.8. --12:09 21 Nayeli Rivas R.N.MedicationsNexplanon 
Subcutaneous. --12:15 21 Nayeli Rivas R.N.AllergiesNo Known Drug Allergy. 
--12:15 21 Nayeli Rivas R.N.PROBLEMS:no known problems.Medication/allergy 
information source: the patient. --12:14 21 Nayeli Rivas R.N.ADDITIONAL 
SURGERIES:                                                                      
                                       2                                    
Clinical Report - Nurses                                      [facility]                                     Emergency Department               
                [address] [place] [address]                            
     Phone #: [phone] ext- [phone]                                         
2021 12:04                       
----------------------------------------------------                            
         Patient: EVETTE MCNAIR                                 MRN: [id number]     
 Acct#: [id number]                               Sex: F : 2000 Age: 20y 
Tonsillectomy. --12:15 21 Nayeli Rivas R.N. History PAST MEDICAL HX: 
Tetanus status: up-to-date. SOCIAL HX: Never smoker. No alcohol use or drug use.
 She was offered HIV testing but declined. Patient education was provided. She 
was offered hepatitis C testing but declined. Patient education was provided. 
She has not traveled outside the [place] Infectious disease exposure: No infectious
 disease exposure. (COVID screen negative). Patient is not a known carrier of 
tuberculosis, hepatitis, HIV, MRSA or VRE. Patient is not a known carrier of 
CRE. SELF HARM ASSESSMENT: Self harm assessment was performed. The patient answe
red "no" to the question(s) "Do you have thoughts of harming or killing 
yourself?", "Do you have a plan for harming or killing yourself?" and "Have you 
recently had thoughts about harming or killing others?". ABUSE ASSESSMENT: Abuse
 assessment. The patient had positive responses to the question(s) "Do you feel 
safe in your home?" (yes). Abuse denied. No suspicion of abuse. No report of 
abuse. NUTRITIONAL RISK ASSESSMENT: The nutritional risk assessment revealed no 
deficiencies. FUNCTIONAL ASSESSMENT: Functional assessment: no impairments 
noted. LEARNING NEEDS ASSESSMENT: The learning needs assessment revealed no 
barriers. FALL RISK ASSESSMENT: Fall risk assessment completed. No risk factors 
identified. SKIN INTEGRITY ASSESSMENT: Skin integrity risk assessment completed.
 No skin integrity risk identified. --12:14 21 Nayeli Rivas R.N. 
Interventions Identification band on patient. --12:14 21 Nayeli Rivas R.N.PHYSICAL ASSESSMENTGENERAL / NEURO / PSYCH: Alert. Oriented X 4. Appears in 
no acute distress.RESPIRATORY: Respirations not labored.EXTREMITIES: Skin intact
 on the extremities. Left ring finger: 1st degree superficial burn. Left midd
lefinger. Left index finger. --12:44 21 Feilx Peoples R.N.NURSING PROGRESS 
NOTESPatient gowned. Reassurance given. Three patient identifiers checked. Call 
light placed in reach. Siderails up x 2. Bed placed in lowest position. Brakes 
of bed on. Patient ready for evaluation- PA notified.--12:15 21 Nayeli Rivas R.N. 12:43 2021 Bacitracin Zinc Topical Ointment 1 application 
given. --12:43 21 Felix Peoples R.N.                                        
                                                                   3            
                       Clinical Report - Nurses                                 
     [facility]                                     Emergency 
Department                               [address] [place] [address]   
                              Phone #: [phone] ext- [phone]                 
                        2021 12:04                       
----------------------------------------------------                            
         Patient: EVETTE MCNAIR                                 MRN: [id number]     
 Acct#: [id number]                               Sex: F : 2000 Age: 
20yDISPOSITION / DISCHARGE No learning barriers present. Discharge instructions 
provided and reviewed with the patient. Patient verbalized understanding. 
Written instructions provided in English. The patient was discharged by the 
physician assistant. She was discharged home. She left ambulatory and via 
private vehicle. Patient driving. --12:50 21 Felix Peoples R.N. 12:49 
21. BP: 122/64. MAP: 83. HR: 62. RR: 16. O2 saturation: 98%. Temp: def
erred. Pain level now: 2/10. --12:50 21 Felix Peoples R.N. Departure time: 
12:50 2021. --12:50 21 Felix Peoples R.N.Locked/Released at 2021
 12:51 by Felix Peoples R.N.  









          Name      Value     Range     Interpretation Code Description Data Janey

rce(s) Supporting 

Document(s)

 

                                                                       









                    ID                  Date                Data Source

 

                    [id number]      2021 12:04:00 PM EDT [facility]

 

                                                                                

                                        

                           1                           Clinical Report - 
Physicians/Mid Levels                                        [facility]                                       Emergency Department             
                    [address] [place] [address]                        
           Phone #: [phone]- [phone]                                    
       2021 12:04                         
----------------------------------------------------                            
           Patient: VEETTE MCNAIR                                   MRN: [id number] 
     Acct#: [id number]                                 Sex: F : 2000 Age:
 20y Time Seen: 12:30 2021. Arrived- By private vehicle. Historian- 
patient.HISTORY OF PRESENT ILLNESS Chief Complaint: BURN. The patient sustained 
a burn to the left upper extremity - left hand, left index finger, left middle 
finger, left ring finger and left little finger. It occurred at work. The injury
 was due to hot liquid (gravy). The patient complains of mild pain. There was no
 smoke inhalation. Patient did not fall.REVIEW OF SYSTEMSLast normal menstrual 
period unknown- nexplanon, denies pregnancy. No difficulty breathing, chest 
pain,visual disturbance, hearing loss or numbness. No weakness, neck pain, 
nausea, easy bleeding orabrasions. No hematuria, spine pain or vomiting. No 
coughing up soot.PAST HISTORYProblems:no known problems. Additional Surgeries: 
Tonsillectomy. Medications: Nexplanon Subcutaneous. Allergies: No Known Drug All
ergy.SOCIAL HISTORYNever smoker. No alcohol use or drug use.PHYSICAL EXAMVital 
Signs: 2021 12:10 BP: 131/71. MAP: 91. HR: 72. RR: 16. O2 saturation: 100%
 on room air.Pain level now: 4/10. Have been reviewed as normal. Oxygen 
saturation normal.Appearance: Alert. Oriented X3. No acute distress.Head: Head 
atraumatic.Eyes: Pupils equal, round and reactive to light. EOM intact. 
Conjunctivae and eyelids normal.ENT: Normal external inspection. Nares normal. 
Pharynx normal.Neck: Neck non-tender.CVS: Heart sounds normal.Respiratory: No 
respiratory distress.                                                           
                                                           2                    
         Clinical Report - Physicians/Mid Levels                                
         [facility]                                        Emergency
 Department                                  [address] [place] [address]                                    Phone #: [phone] ext- [phone]      
                                      2021 12:04                          
----------------------------------------------------                            
            Patient: EVETTE MCNAIR                                    MRN: 
[id number]      Acct#: [id number]                                  Sex: F : 
2000 Age: 20y  Abdomen: No visible injury.  Back: No tenderness.  Skin: No
 abrasions or lacerations. Left hand: small 1st degree burn dorsal aspect and 
volar aspect. Left  small finger: small 1st degree burn volar aspect. Left ring 
finger: small 1st degree burn volar aspect. Left  middle finger: small 1st 
degree burn volar aspect. Left index finger: small 1st degree burn volar aspect.
  No circumferential burn present, vesicles present or contamination present.  
Left Upper Extremity area: 1% BSA.  Percent Total Body Surface Area Burned: 1%. 
 Extremities: Extremities atraumatic.  Neuro: Oriented X 3.PROGRESS AND 
PROCEDURESCourse of Care: 12:. Evaluation after observation. 
(Discussed superficial 1st degree burn,wound care. s/s of infection and pt is 
agreeable with dx and tx plan.).  Patient counseled in person regarding the 
patient's stable condition, diagnosis and need for follow-up.  Patient agrees 
with plan of care. 12:.  Disposition: Discharged home in good and 
improved condition (:).CLINICAL IMPRESSION Single first degree 
thermal burn to the left index finger, to the left middle finger, to the left 
ring finger and to the left little finger. TOTAL BSA of burn = less than 10% 
(approximately). BSA of 1st degree burn = less than 10% (approximately). BSA of 
2nd degree burn = 0% BSA of 3rd degree burn = 0%.INSTRUCTIONS Protect area of 
burn and keep clean. Change dressing daily. Keep wounds dry. You may wash wounds
 briefly, then dry. Do not work today, tomorrow (may return to work on Monday). 
 Warnings: GENERAL WARNINGS: Return or contact your physician immediately if 
your condition  worsens or changes unexpectedly, if not improving as expected, 
or if other problems arise. Specifically  return if pain or fever worsens.  Your
 Current Medications: Your current home medications have been reviewed.  
CONTINUE TAKING THE FOLLOWING MEDICATIONS:  Nexplanon Subcutaneous.  
Prescription Medications:  bacitracin 500 unit/gram topical ointment Apply 1 a 
small amount twice a day for 10 days -- Dispense 1                              
                                                                               3
                          Clinical Report - Physicians/Mid Levels               
                       [facility]                                   
  Emergency Department                               [address] [place] [address]                                 Phone #: [phone]- [phone]     
                                    2021 12:04                       
----------------------------------------------------                            
         Patient: EVETTE MCNAIR                                 MRN: [id number]     
 [id number]                               Sex: F : 2000 Age: 20y 
tube. Refills: 0. Substitution permitted. [facility] [address] [place] [address] ; [place], [place] [id number]. Phone: [phone] FaxNumber: [phone]. aspirin 325 mg tablet Take 1 tablet four times a day for 10 days -- 
PP. Dispense 40 tablet. Refills: 0. Substitution permitted. [facility] [address] [place] [address] ; [place], [place] [id number]. Phone: [phone]
 FaxNumber: [phone]. Follow-up: Follow up with your doctor as needed. 
Reason for referral: evaluation and treatment. Summary of care provided to 
patient. Understanding of the discharge instructions verbalized by 
patient.(Electronically signed by ABHAY Parker 2021 21:44)  









          Name      Value     Range     Interpretation Code Description Data Janey

rce(s) Supporting 

Document(s)

 

                                                                       









                    ID                  Date                Data Source

 

                    [id number]           2021 12:00:00 AM EDT [facility]









          Name      Value     Range     Interpretation Code Description Data Janey

rce(s) Supporting 

Document(s)

 

          SARS-CoV-2 RNA Resp Ql VERA+probe Not Detected                         

      [facility]     

 

                                        This lab was ordered by [employer]-

 Employee and reported by [employer]. 









                    ID                  Date                Data Source

 

                    [id number]           2021 12:00:00 AM EDT [facility]









          Name      Value     Range     Interpretation Code Description Data Janey

rce(s) Supporting 

Document(s)

 

          SARS-CoV-2 RNA Resp Ql VERA+probe Not Detected                         

      [facility]     

 

                                        This lab was ordered by [employer]-

 Employee and reported by [employer]. 









                    ID                  Date                Data Source

 

                    [id number]           2021 10:05:00 AM EDT [facility]









          Name      Value     Range     Interpretation Code Description Data Janey

rce(s) Supporting 

Document(s)

 

          SARS-CoV-2 Not Detected                               [facility]     

 

                                        This lab was ordered by [employer]

ics and reported by [facility]. 









                    ID                  Date                Data Source

 

                    [id number]           2021 09:00:00 AM EDT [facility]









          Name      Value     Range     Interpretation Code Description Data Janey

rce(s) Supporting 

Document(s)

 

          SARS-CoV-2 Not Detected                               [facility]     

 

                                        This lab was ordered by [employer]

ics and reported by [facility]. 









                    ID                  Date                Data Source

 

                    [id number]           2021 12:00:00 AM EDT [facility]









          Name      Value     Range     Interpretation Code Description Data Janey

rce(s) Supporting 

Document(s)

 

          SARS      Not Detected                               [facility]     

 

                                        This lab was ordered by [employer]-

 Employee and reported by [employer]

 [facility]  [place]. 









                    ID                  Date                Data Source

 

                    [id number]        2021 12:00:00 AM EDT [facility]









          Name      Value     Range     Interpretation Code Description Data Janey

rce(s) Supporting 

Document(s)

 

          SARS coronavirus 2 Ag Negative                                [facility]  

   

 

                                        This lab was ordered by [employer] and rep

orted by [employer]. 









                    ID                  Date                Data Source

 

                    [id number]        2021 12:00:00 AM EDT [facility]









          Name      Value     Range     Interpretation Code Description Data Janey

rce(s) Supporting 

Document(s)

 

          SARS coronavirus 2 Ag Negative                                [facility]  

   

 

                                        This lab was ordered by [employer] and rep

orted by [employer]. 









                    ID                  Date                Data Source

 

                    [id number]           2021 11:55:00 AM EDT [facility]









          Name      Value     Range     Interpretation Code Description Data Janey

rce(s) Supporting 

Document(s)

 

          SARS-CoV-2 Not Detected                               [facility]     

 

                                        This lab was ordered by [employer]

ics and reported by [facility]. 









                    ID                  Date                Data Source

 

                    [id number]        2021 12:00:00 AM EDT [facility]









          Name      Value     Range     Interpretation Code Description Data Janey

rce(s) Supporting 

Document(s)

 

          SARS coronavirus 2 Ag Negative                                [facility]  

   

 

                                        This lab was ordered by [employer] and rep

orted by [employer]. 









                    ID                  Date                Data Source

 

                    [id number]        2021 12:00:00 AM EDT [facility]









          Name      Value     Range     Interpretation Code Description Data Janey

rce(s) Supporting 

Document(s)

 

          SARS coronavirus 2 Ag Negative                                [facility]  

   

 

                                        This lab was ordered by [employer] and rep

orted by [employer]. 









                    ID                  Date                Data Source

 

                    WET PREP            2021 12:00:00 AM EDT eCW1 ([facility])









          Name      Value     Range     Interpretation Code Description Data Janey

rce(s) Supporting 

Document(s)

 

                    pos                           whiff     eCW1 ([facility])  

 

                    pos                           Clue Cells eCW1 ([facility])  

 

                    5.5                           PH        eCW1 ([facility])  

 

                    neg                           Trichomoniads [facility]  

 

                    neg                           Hypae     eCW1 ([facility])  

 

                                                  WET PREP  eCW1 ([facility])  

 

                    neg                           Lactobacillus [facility]CW1 ([facility]  

 

                    neg                           WBC       eCW1 ([facility])  

 

                    few                           RBC       eCW1 ([facility])  









                    ID                  Date                Data Source

 

                    CHLAMYDIA & GC DNA PROBES 2021 12:00:00 AM EDT eCW1 ([facility])









          Name      Value     Range     Interpretation Code Description Data Janey

rce(s) Supporting 

Document(s)

 

                    Negative  Negative            GC DNA PROBE eCW1 ([facility])  

 

                    Negative  Negative            CHLAMYDIA DNA PROBE eCW1 ([facility]) 

 









                    ID                  Date                Data Source

 

                    [id number]           2021 06:00:00 PM EDT [facility]









          Name      Value     Range     Interpretation Code Description Data Janey

rce(s) Supporting 

Document(s)

 

          SARS-CoV-2 Not Detected                               [facility]     

 

                                        This lab was ordered by [employer] and reported by [facility]. 









                    ID                  Date                Data Source

 

                    [id number]           2021 11:00:00 AM EDT [facility]









          Name      Value     Range     Interpretation Code Description Data Janey

rce(s) Supporting 

Document(s)

 

          SARS-CoV-2 Not Detected                               [facility]     

 

                                        This lab was ordered by [employer] and reported by [facility]. 









                    ID                  Date                Data Source

 

                    [id number]           2021 10:43:00 AM EDT [facility]









          Name      Value     Range     Interpretation Code Description Data Janey

rce(s) Supporting 

Document(s)

 

          SARS-CoV-2 Not Detected                               [facility]     

 

                                        This lab was ordered by [employer] and reported by [employer]. 









                    ID                  Date                Data Source

 

                    [id number]           2021 10:24:00 AM EDT [facility]









          Name      Value     Range     Interpretation Code Description Data Janey

rce(s) Supporting 

Document(s)

 

          SARS-CoV-2 Not Detected                               [facility]     

 

                                        This lab was ordered by [employer] and reported by [facility]. 









                    ID                  Date                Data Source

 

                    [id number]           2021 09:52:00 AM EDT [facility]









          Name      Value     Range     Interpretation Code Description Data Janey

rce(s) Supporting 

Document(s)

 

          SARS-CoV-2 Not Detected                               [facility]     

 

                                        This lab was ordered by [employer] and reported by [facility]. 









                    ID                  Date                Data Source

 

                    [id number]           2021 03:24:00 PM EDT [facility]









          Name      Value     Range     Interpretation Code Description Data Janey

rce(s) Supporting 

Document(s)

 

          SARS-CoV-2 Not Detected                               [facility]     

 

                                        This lab was ordered by [employer] and reported by [facility]. 









                    ID                  Date                Data Source

 

                    [id number]        2021 12:00:00 AM EDT [facility]









          Name      Value     Range     Interpretation Code Description Data Janey

rce(s) Supporting 

Document(s)

 

          SARS coronavirus 2 Ag Negative                                [facility]  

   

 

                                        This lab was ordered by [employer] and rep

orted by [employer]. 









                    ID                  Date                Data Source

 

                    [id number]           2021 12:00:00 AM EDT [facility]









          Name      Value     Range     Interpretation Code Description Data Janey

rce(s) Supporting 

Document(s)

 

          SARS      Not Detected                               [facility]     

 

                                        This lab was ordered by [facility] 

for [facility]-EMPLOYEE and reported

 by [employer]. 









                    ID                  Date                Data Source

 

                    [id number]           2021 12:00:00 AM EDT [facility]









          Name      Value     Range     Interpretation Code Description Data Janey

rce(s) Supporting 

Document(s)

 

          SARS      Not Detected                               [facility]     

 

                                        This lab was ordered by [facility]-

 Employee and reported by [employer]. 









                    ID                  Date                Data Source

 

                    [id number]        2021 12:00:00 AM EDT [facility]









          Name      Value     Range     Interpretation Code Description Data Janey

rce(s) Supporting 

Document(s)

 

          SARS coronavirus 2 Ag Negative                                [facility]  

   

 

                                        This lab was ordered by [employer] and rep

orted by [facility]. 









                    ID                  Date                Data Source

 

                    [id number]        2021 12:00:00 AM EDT [facility]









          Name      Value     Range     Interpretation Code Description Data Janey

rce(s) Supporting 

Document(s)

 

          SARS coronavirus 2 Ag Negative                                [facility]  

   

 

                                        This lab was ordered by [facility] and rep

orted by [facility]. 









                    ID                  Date                Data Source

 

                    [id number]           2021 01:55:00 PM EDT [facility]









          Name      Value     Range     Interpretation Code Description Data Janey

rce(s) Supporting 

Document(s)

 

          SARS-CoV-2 Not Detected                               [facility]     

 

                                        This lab was ordered by [employer]

ics and reported by [facility]. 









                    ID                  Date                Data Source

 

                    [id number]        2021 12:00:00 AM EDT [facility]









          Name      Value     Range     Interpretation Code Description Data Janey

rce(s) Supporting 

Document(s)

 

          SARS coronavirus 2 Ag Negative                                [facility]  

   

 

                                        This lab was ordered by [facility] and rep

orted by [facility]. 









                    ID                  Date                Data Source

 

                    [id number]           2021 09:49:00 AM EDT [facility]









          Name      Value     Range     Interpretation Code Description Data Janey

rce(s) Supporting 

Document(s)

 

          SARS-CoV-2 Not Detected                               [facility]     

 

                                        This lab was ordered by [employer]

ics and reported by [facility]. 









                    ID                  Date                Data Source

 

                    [id number]        2021 12:00:00 AM EDT [facility]









          Name      Value     Range     Interpretation Code Description Data Janye

rce(s) Supporting 

Document(s)

 

          SARS coronavirus 2 Ag Negative                                [facility]  

   

 

                                        This lab was ordered by [facility] and rep

orted by [facility]. 









                    ID                  Date                Data Source

 

                    [id number]           2021 02:36:00 PM EDT [facility]









          Name      Value     Range     Interpretation Code Description Data Janey

rce(s) Supporting 

Document(s)

 

          SARS-CoV-2 Not Detected                               [facility]     

 

                                        This lab was ordered by [employer]

[email or web address] and reported by [facility]. 









                    ID                  Date                Data Source

 

                    [id number]           2021 01:51:00 PM EDT [facility]









          Name      Value     Range     Interpretation Code Description Data Janey

rce(s) Supporting 

Document(s)

 

          SARS-CoV-2 Not Detected                               [facility]     

 

                                        This lab was ordered by [employer]

ics and reported by [facility]. 









                    ID                  Date                Data Source

 

                    [id number]        2021 12:00:00 AM EDT [facility]









          Name      Value     Range     Interpretation Code Description Data Janey

rce(s) Supporting 

Document(s)

 

          SARS coronavirus 2 Ag Negative                                [facility]  

   

 

                                        This lab was ordered by [employer] and rep

orted by [employer]. 









                    ID                  Date                Data Source

 

                    [id number]        2021 12:00:00 AM EDT [facility]









          Name      Value     Range     Interpretation Code Description Data Janey

rce(s) Supporting 

Document(s)

 

          SARS coronavirus 2 Ag Negative                                [facility]  

   

 

                                        This lab was ordered by [employer] and rep

orted by [employer]. 









                    ID                  Date                Data Source

 

                    [id number]           2021 12:00:00 AM EDT [facility]









          Name      Value     Range     Interpretation Code Description Data Janey

rce(s) Supporting 

Document(s)

 

          SARS      Not Detected                               [facility]     

 

                                        This lab was ordered by [facility]-

 Employee and reported by [employer] [facility] [place]

 [facility]  [place]. 









                    ID                  Date                Data Source

 

                    [id number]        03/15/2021 12:00:00 AM EDT [facility]









          Name      Value     Range     Interpretation Code Description Data Janey

rce(s) Supporting 

Document(s)

 

          SARS coronavirus 2 Ag Negative                                [facility]  

   

 

                                        This lab was ordered by [employer] and rep

orted by [facility]. 









                    ID                  Date                Data Source

 

                    [id number]        2021 12:00:00 AM EST [facility]









          Name      Value     Range     Interpretation Code Description Data Janey

rce(s) Supporting 

Document(s)

 

          SARS coronavirus 2 Ag Negative                                [facility]  

   

 

                                        This lab was ordered by [employer] and rep

orted by [facility]. 









                    ID                  Date                Data Source

 

                    [id number]        2021 12:00:00 AM EST [facility]









          Name      Value     Range     Interpretation Code Description Data Janey

rce(s) Supporting 

Document(s)

 

          SARS coronavirus 2 Ag Negative                                [facility]  

   

 

                                        This lab was ordered by [employer] and rep

orted by [facility]. 









                    ID                  Date                Data Source

 

                    [id number]        2021 12:00:00 AM EST [facility]









          Name      Value     Range     Interpretation Code Description Data Janey

rce(s) Supporting 

Document(s)

 

          SARS coronavirus 2 pcr Negative                                [facility] 

    

 

                                        This lab was ordered by [employer] and rep

orted by [facility]. 









                    ID                  Date                Data Source

 

                    [id number]        2021 12:00:00 AM EST [facility]









          Name      Value     Range     Interpretation Code Description Data Janey

rce(s) Supporting 

Document(s)

 

          SARS coronavirus 2 pcr Negative                                [facility] 

    

 

                                        This lab was ordered by [facility] and rep

orted by [facility]. 









                    ID                  Date                Data Source

 

                    [id number]        2021 12:00:00 AM EST [facility]









          Name      Value     Range     Interpretation Code Description Data Janey

rce(s) Supporting 

Document(s)

 

          SARS coronavirus 2 pcr Negative                                [facility] 

    

 

                                        This lab was ordered by [facility] and rep

orted by [facility]. 









                    ID                  Date                Data Source

 

                    [id number]        2021 12:00:00 AM EST [facility]









          Name      Value     Range     Interpretation Code Description Data Janey

rce(s) Supporting 

Document(s)

 

          SARS coronavirus 2 pcr Negative                                [facility] 

    

 

                                        This lab was ordered by [facility] and rep

orted by [facility]. 









                    ID                  Date                Data Source

 

                    [id number]        02/15/2021 12:00:00 AM EST [facility]









          Name      Value     Range     Interpretation Code Description Data Janey

rce(s) Supporting 

Document(s)

 

          SARS coronavirus 2 pcr Negative                                [facility] 

    

 

                                        This lab was ordered by [facility] and rep

orted by [facility]. 









                    ID                  Date                Data Source

 

                    [id number]           2021 12:00:00 AM EST [facility]









          Name      Value     Range     Interpretation Code Description Data Janey

rce(s) Supporting 

Document(s)

 

          SARS      Not Detected                               [facility]     

 

                                        This lab was ordered by [facility]-

 Employee and reported by [employer] [facility] [place]

 [facility]  [employer] [facility]. 









                    ID                  Date                Data Source

 

                    [id number]        2021 12:00:00 AM EST [facility]









          Name      Value     Range     Interpretation Code Description Data Janey

rce(s) Supporting 

Document(s)

 

          SARS coronavirus 2 pcr Negative                                [facility] 

    

 

                                        This lab was ordered by [facility]da and rep

orted by [facility]. 









                    ID                  Date                Data Source

 

                    [id number]           2021 05:00:00 PM EST [facility]









          Name      Value     Range     Interpretation Code Description Data Janey

rce(s) Supporting 

Document(s)

 

          SARS-CoV-2 Not Detected                               [facility]     

 

                                        This lab was ordered by [employer]

ics and reported by [facility]. 









                    ID                  Date                Data Source

 

                    [id number]          2021 12:00:00 AM EST [facility]









          Name      Value     Range     Interpretation Code Description Data Janey

rce(s) Supporting 

Document(s)

 

          SARS coronavirus 2 (SARS-CoV-2) Negative                              

  [facility]     

 

                                        This lab was ordered by [employer] and reported by [employer].

 









                    ID                  Date                Data Source

 

                    [id number]          2021 12:00:00 AM EST [facility]









          Name      Value     Range     Interpretation Code Description Data Janey

rce(s) Supporting 

Document(s)

 

          SARS coronavirus 2 (SARS-CoV-2) Negative                              

  [facility]     

 

                                        This lab was ordered by [employer] and reported by [employer].

 









                    ID                  Date                Data Source

 

                    [id number]           2020 12:00:00 AM EST [facility]









          Name      Value     Range     Interpretation Code Description Data Janey

rce(s) Supporting 

Document(s)

 

          SARS      Not Detected                               [facility]     

 

                                        This lab was ordered by [employer] [facility]-

 Employee and reported by [employer]. 









                    ID                  Date                Data Source

 

                    [id number]           2020 03:29:00 PM EST [facility]









          Name      Value     Range     Interpretation Code Description Data Janey

rce(s) Supporting 

Document(s)

 

          SARS-CoV-2                                         [facility]     

 

                                        This lab was ordered by [employer] and reported by [employer]. 









                    ID                  Date                Data Source

 

                    [id number]           2020 07:58:00 AM EST [facility]









          Name      Value     Range     Interpretation Code Description Data Janey

rce(s) Supporting 

Document(s)

 

          SARS-CoV-2                                         [facility]     

 

                                        This lab was ordered by [employer] and reported by [employer]. 









                    ID                  Date                Data Source

 

                    [id number]           2020 03:16:00 PM EST [facility]









          Name      Value     Range     Interpretation Code Description Data Janey

rce(s) Supporting 

Document(s)

 

          SARS-CoV-2                                         [facility]     

 

                                        This lab was ordered by [employer] and reported by [employer]. 









                    ID                  Date                Data Source

 

                    [id number]           2020 12:00:00 AM EST [facility]









          Name      Value     Range     Interpretation Code Description Data Janey

rce(s) Supporting 

Document(s)

 

          SARS                                              [facility]     

 

                                        This lab was ordered by [employer]-

 Employee and reported by [employer]. 









                    ID                  Date                Data Source

 

                    [id number]           10/28/2020 12:00:00 AM EDT [facility]









          Name      Value     Range     Interpretation Code Description Data Janey

rce(s) Supporting 

Document(s)

 

          SARS                                              [facility]     

 

                                        This lab was ordered by [employer]-

 Employee and reported by [employer]. 









                    ID                  Date                Data Source

 

                    [id number]           10/26/2020 12:00:00 AM EDT [facility]









          Name      Value     Range     Interpretation Code Description Data Janey

rce(s) Supporting 

Document(s)

 

          SARS                                              [facility]     

 

                                        This lab was ordered by [employer] [facility]-

 Employee and reported by [employer]. 









                    ID                  Date                Data Source

 

                    [id number]           10/14/2020 01:36:00 PM EDT [facility]









          Name      Value     Range     Interpretation Code Description Data Janey

rce(s) Supporting 

Document(s)

 

          SARS-CoV-2                                         [facility]     

 

                                        This lab was ordered by [employer] and reported by [facility]. 









                    ID                  Date                Data Source

 

                    [id number]           10/07/2020 03:10:00 PM EDT [facility]









          Name      Value     Range     Interpretation Code Description Data Janey

rce(s) Supporting 

Document(s)

 

          SARS-CoV-2                                         [facility]     

 

                                        This lab was ordered by [employer] and reported by [facility]. 









                    ID                  Date                Data Source

 

                    [id number]           2020 12:12:00 PM EDT [facility]









          Name      Value     Range     Interpretation Code Description Data Janey

rce(s) Supporting 

Document(s)

 

          SARS-CoV-2                                         [facility]     

 

                                        This lab was ordered by [employer] and reported by [facility]. 









                    ID                  Date                Data Source

 

                    [id number]           2020 10:14:00 AM EDT [facility]









          Name      Value     Range     Interpretation Code Description Data Janey

rce(s) Supporting 

Document(s)

 

          SARS-CoV-2                                         [facility]     

 

                                        This lab was ordered by [employer] and reported by [facility]. 









                    ID                  Date                Data Source

 

                    [id number]           2020 03:40:00 PM EDT [facility]









          Name      Value     Range     Interpretation Code Description Data Janey

rce(s) Supporting 

Document(s)

 

          SARS-CoV-2                                         [facility]     

 

                                        This lab was ordered by [employer] and reported by [facility]. 









                    ID                  Date                Data Source

 

                    [id number]           2020 04:30:00 PM EDT [facility]









          Name      Value     Range     Interpretation Code Description Data Janey

rce(s) Supporting 

Document(s)

 

          SARS-CoV-2                                         [facility]     

 

                                        This lab was ordered by [employer] and reported by [facility]. 









                    ID                  Date                Data Source

 

                    [id number]           2020 12:00:00 AM EDT [facility]









          Name      Value     Range     Interpretation Code Description Data Janey

rce(s) Supporting 

Document(s)

 

          2019-nCoV RNA XXX VERA+probe-Imp                                       

  [facility]     

 

                                        This lab was ordered by [employer] and repo

rted by [employer]. 







                                        Procedure

 

                                          



                                                                                
                                                                                
                                                                                
                                                                                
                                                                                
                                                                                
                                                                                
                                                                                
                                                                                
                                                                                
                                                                                
                                      



Social History

          



           Code       Duration   Value      Status     Description Data Source(s

)

 

                      10/12/2021 12:00:00 AM EDT Current non-smoker completed  C

urrent non-smoker 

[facility] ([facility])

 

           Smoking    10/12/2021 12:00:00 AM EDT Never smoker completed  Never s

moker [facility] 

([facility])

 

           Smoking    2021 12:00:00 AM EDT Never Smoker completed  Never S

[facility]

 

           Smoking    2021 12:00:00 AM EDT Never Smoker completed  Never S

moker [facility]CW1 

[facility]

 

           Smoking    2021 12:00:00 AM EDT Never Smoker completed  Never S

moker eCW1 

[facility]

 

           Smoking    2021 12:00:00 AM EDT Never Smoker completed  Never S

moker eCW1 

([facility]



                                                                                
                                                                    



Vital Signs

          



                    ID                  Date                Data Source

 

                    UNK                                      









           Name       Value      Range      Interpretation Code Description Data

 Source(s)

 

           Body height 160.02 cm                        160.02 cm  [facility]

 

           Body weight 65.227 kg                        65.227 kg  [facility]

 

           Systolic blood pressure 120 mm[Hg]                       120 mm[Hg] N

[facility] ([facility]

 

           Diastolic blood pressure 78 mm[Hg]                        78 mm[Hg]  

[facility] ([facility]

 

           Body mass index (BMI) [Ratio] 25.47 kg/m2            Overweight 25.47

 kg/m2 [facility] 

([facility])

 

           Body weight 138 [lb_av]                       138 [lb_av] eCW1 ([facility])

 

           Body weight 62.6 kg                          62.6 kg    eCW1 ([facility])

 

           Body height 63 [in_i]                        63 [in_i]  eCW1 ([facility])

 

           Body mass index (BMI) [Ratio] 24.44 kg/m2                       24.44

 kg/m2 [facility]

 

           Systolic blood pressure 118 mm[Hg]                       118 mm[Hg] e

CW1 ([facility]

 

           Diastolic blood pressure 76 mm[Hg]                        76 mm[Hg]  

[facility]CW[facility]

 

           Diastolic blood pressure 78 mm[Hg]                        78 mm[Hg]  

eCW1 [facility]

 

           Body weight 137.2 [lb_av]                       137.2 [lb_av] eCW1 ([facility])

 

           Body height 63 [in_i]                        63 [in_i]  eCW1 ([facility])

 

           Body mass index (BMI) [Ratio] 24.30 kg/m2                       24.30

 kg/m2 [facility]CW[facility] ([facility]

 

           Systolic blood pressure 110 mm[Hg]                       110 mm[Hg] e

[facility] ([facility]

 

           Body weight 140 [lb_av]                       140 [lb_av] eCW1 ([facility])

 

           Body weight 63.5 kg                          63.5 kg    eCW1 ([facility])

 

           Body height 63 [in_i]                        63 [in_i]  eCW1 ([facility] / [facility])

 

           Body mass index (BMI) [Ratio] 24.8 kg/m2                       24.8 k

g/m2 eCW1 ([facility]

 

           Systolic blood pressure 114 mm[Hg]                       114 mm[Hg] e

CW1 ([facility]

 

           Diastolic blood pressure 74 mm[Hg]                        74 mm[Hg]  

eCW1 ([facility])



                                                                                
                  



Patient Treatment Plan of Care

          



             Planned Activity Planned Date Details      Description  Data Source

(s)

 

                Metronidazole 0.0075 MG/MG Vaginal Gel [[facility]Gel] 10/12/2021 12:

00:00 AM EDT                  

                                        [facility]

 [place]

 

             Metronidazole 0.0075 MG/MG Vaginal Gel 2021 12:00:00 AM EDT  

                         eCW1 

([facility]

 

             Terbinafine HCl 1 % 2021 12:00:00 AM EDT                     

      eCW1 ([facility]

 

             Naftifine hydrochloride 10 MG/ML Topical Cream 2021 12:00:00 

AM EDT                           

eCW1 ([facility]

 

             Terbinafine HCl 1 % 2021 12:00:00 AM EDT                     

      eCW1 ([facility]

 

             Naftifine hydrochloride 10 MG/ML Topical Cream 2021 12:00:00 

AM EDT                           

eCW1 ([facility]

 

             Fluconazole 150 MG Oral Tablet 2021 12:00:00 AM EDT          

                 eCW1 ([facility]

## 2022-11-21 ENCOUNTER — HOSPITAL ENCOUNTER (OUTPATIENT)
Dept: HOSPITAL 53 - M RAD | Age: 22
End: 2022-11-21
Attending: PHYSICIAN ASSISTANT
Payer: COMMERCIAL

## 2022-11-21 DIAGNOSIS — M25.561: Primary | ICD-10-CM

## 2023-02-21 ENCOUNTER — HOSPITAL ENCOUNTER (EMERGENCY)
Dept: HOSPITAL 53 - M ED | Age: 23
Discharge: HOME | End: 2023-02-21
Payer: COMMERCIAL

## 2023-02-21 VITALS — SYSTOLIC BLOOD PRESSURE: 118 MMHG | DIASTOLIC BLOOD PRESSURE: 72 MMHG

## 2023-02-21 VITALS — BODY MASS INDEX: 22.38 KG/M2 | WEIGHT: 126.32 LBS | HEIGHT: 63 IN

## 2023-02-21 DIAGNOSIS — S51.011A: Primary | ICD-10-CM
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
## 2023-02-21 DIAGNOSIS — Z23: ICD-10-CM

## 2023-02-21 PROCEDURE — 99283 EMERGENCY DEPT VISIT LOW MDM: CPT

## 2023-02-21 PROCEDURE — 90715 TDAP VACCINE 7 YRS/> IM: CPT

## 2023-02-21 PROCEDURE — 73080 X-RAY EXAM OF ELBOW: CPT

## 2023-02-21 PROCEDURE — 90471 IMMUNIZATION ADMIN: CPT

## 2023-02-21 PROCEDURE — 12002 RPR S/N/AX/GEN/TRNK2.6-7.5CM: CPT

## 2023-02-21 PROCEDURE — 96374 THER/PROPH/DIAG INJ IV PUSH: CPT

## 2023-03-20 ENCOUNTER — HOSPITAL ENCOUNTER (EMERGENCY)
Dept: HOSPITAL 53 - M ED | Age: 23
Discharge: LEFT BEFORE DISCHARGE | End: 2023-03-20
Payer: COMMERCIAL

## 2023-03-20 VITALS — WEIGHT: 131.4 LBS | BODY MASS INDEX: 23.28 KG/M2 | HEIGHT: 63 IN

## 2023-03-20 VITALS — DIASTOLIC BLOOD PRESSURE: 65 MMHG | SYSTOLIC BLOOD PRESSURE: 111 MMHG

## 2023-03-20 DIAGNOSIS — Z53.21: Primary | ICD-10-CM

## 2023-10-17 ENCOUNTER — HOSPITAL ENCOUNTER (EMERGENCY)
Dept: HOSPITAL 53 - M ED | Age: 23
Discharge: LEFT BEFORE DISCHARGE | End: 2023-10-17
Payer: COMMERCIAL

## 2023-10-17 VITALS — SYSTOLIC BLOOD PRESSURE: 159 MMHG | TEMPERATURE: 98.9 F | OXYGEN SATURATION: 100 % | DIASTOLIC BLOOD PRESSURE: 92 MMHG

## 2023-10-17 VITALS — HEIGHT: 63 IN | WEIGHT: 138.67 LBS | BODY MASS INDEX: 24.57 KG/M2

## 2023-10-17 DIAGNOSIS — Z53.21: Primary | ICD-10-CM

## 2024-08-01 ENCOUNTER — HOSPITAL ENCOUNTER (OUTPATIENT)
Dept: HOSPITAL 53 - M SFHCWAGY | Age: 24
End: 2024-08-01
Attending: NURSE PRACTITIONER
Payer: COMMERCIAL

## 2024-08-01 DIAGNOSIS — Z12.4: Primary | ICD-10-CM

## 2024-08-01 DIAGNOSIS — R87.5: ICD-10-CM

## 2024-08-29 ENCOUNTER — HOSPITAL ENCOUNTER (OUTPATIENT)
Dept: HOSPITAL 53 - M LAB REF | Age: 24
End: 2024-08-29
Attending: PHYSICIAN ASSISTANT
Payer: COMMERCIAL

## 2024-08-29 DIAGNOSIS — B34.9: Primary | ICD-10-CM
